# Patient Record
Sex: MALE | Race: WHITE | Employment: OTHER | URBAN - METROPOLITAN AREA
[De-identification: names, ages, dates, MRNs, and addresses within clinical notes are randomized per-mention and may not be internally consistent; named-entity substitution may affect disease eponyms.]

---

## 2017-03-14 ENCOUNTER — HOSPITAL ENCOUNTER (OUTPATIENT)
Dept: SURGERY | Age: 77
Discharge: HOME | End: 2017-03-14
Payer: MEDICARE

## 2017-03-14 ENCOUNTER — HOSPITAL ENCOUNTER (OUTPATIENT)
Dept: PHYSICAL THERAPY | Age: 77
Discharge: HOME | End: 2017-03-14
Payer: MEDICARE

## 2017-03-14 VITALS
SYSTOLIC BLOOD PRESSURE: 153 MMHG | OXYGEN SATURATION: 95 % | DIASTOLIC BLOOD PRESSURE: 83 MMHG | HEART RATE: 52 BPM | BODY MASS INDEX: 30.88 KG/M2 | WEIGHT: 228 LBS | HEIGHT: 72 IN | TEMPERATURE: 96.8 F

## 2017-03-14 LAB
ANION GAP BLD CALC-SCNC: 7 MMOL/L (ref 7–16)
APPEARANCE UR: CLEAR
APTT PPP: 27.5 SEC (ref 25.3–32.9)
ATRIAL RATE: 50 BPM
BACTERIA SPEC CULT: ABNORMAL
BASOPHILS # BLD AUTO: 0.1 K/UL (ref 0–0.2)
BASOPHILS # BLD: 1 % (ref 0–2)
BILIRUB UR QL: NEGATIVE
BUN SERPL-MCNC: 27 MG/DL (ref 8–23)
CALCIUM SERPL-MCNC: 9.2 MG/DL (ref 8.3–10.4)
CALCULATED P AXIS, ECG09: 8 DEGREES
CALCULATED R AXIS, ECG10: 61 DEGREES
CALCULATED T AXIS, ECG11: 7 DEGREES
CHLORIDE SERPL-SCNC: 106 MMOL/L (ref 98–107)
CO2 SERPL-SCNC: 30 MMOL/L (ref 21–32)
COLOR UR: YELLOW
CREAT SERPL-MCNC: 1.24 MG/DL (ref 0.8–1.5)
DIAGNOSIS, 93000: NORMAL
DIASTOLIC BP, ECG02: NORMAL MMHG
DIFFERENTIAL METHOD BLD: ABNORMAL
EOSINOPHIL # BLD: 0.4 K/UL (ref 0–0.8)
EOSINOPHIL NFR BLD: 5 % (ref 0.5–7.8)
ERYTHROCYTE [DISTWIDTH] IN BLOOD BY AUTOMATED COUNT: 15.3 % (ref 11.9–14.6)
GLUCOSE SERPL-MCNC: 93 MG/DL (ref 65–100)
GLUCOSE UR STRIP.AUTO-MCNC: NEGATIVE MG/DL
HCT VFR BLD AUTO: 42.6 % (ref 41.1–50.3)
HGB BLD-MCNC: 14.3 G/DL (ref 13.6–17.2)
HGB UR QL STRIP: NEGATIVE
IMM GRANULOCYTES # BLD: 0 K/UL (ref 0–0.5)
IMM GRANULOCYTES NFR BLD AUTO: 0.2 % (ref 0–5)
INR PPP: 1 (ref 0.9–1.2)
KETONES UR QL STRIP.AUTO: NEGATIVE MG/DL
LEUKOCYTE ESTERASE UR QL STRIP.AUTO: NEGATIVE
LYMPHOCYTES # BLD AUTO: 41 % (ref 13–44)
LYMPHOCYTES # BLD: 3.3 K/UL (ref 0.5–4.6)
MCH RBC QN AUTO: 30.4 PG (ref 26.1–32.9)
MCHC RBC AUTO-ENTMCNC: 33.6 G/DL (ref 31.4–35)
MCV RBC AUTO: 90.4 FL (ref 79.6–97.8)
MONOCYTES # BLD: 0.6 K/UL (ref 0.1–1.3)
MONOCYTES NFR BLD AUTO: 8 % (ref 4–12)
NEUTS SEG # BLD: 3.7 K/UL (ref 1.7–8.2)
NEUTS SEG NFR BLD AUTO: 45 % (ref 43–78)
NITRITE UR QL STRIP.AUTO: NEGATIVE
P-R INTERVAL, ECG05: 156 MS
PH UR STRIP: 5.5 [PH] (ref 5–9)
PLATELET # BLD AUTO: 202 K/UL (ref 150–450)
PMV BLD AUTO: 12.2 FL (ref 10.8–14.1)
POTASSIUM SERPL-SCNC: 4.8 MMOL/L (ref 3.5–5.1)
PROT UR STRIP-MCNC: NEGATIVE MG/DL
PROTHROMBIN TIME: 10.6 SEC (ref 9.6–12)
Q-T INTERVAL, ECG07: 460 MS
QRS DURATION, ECG06: 98 MS
QTC CALCULATION (BEZET), ECG08: 419 MS
RBC # BLD AUTO: 4.71 M/UL (ref 4.23–5.67)
SERVICE CMNT-IMP: ABNORMAL
SODIUM SERPL-SCNC: 143 MMOL/L (ref 136–145)
SP GR UR REFRACTOMETRY: 1.02 (ref 1–1.02)
SYSTOLIC BP, ECG01: NORMAL MMHG
UROBILINOGEN UR QL STRIP.AUTO: 0.2 EU/DL (ref 0.2–1)
VENTRICULAR RATE, ECG03: 50 BPM
WBC # BLD AUTO: 8.1 K/UL (ref 4.3–11.1)

## 2017-03-14 PROCEDURE — G8980 MOBILITY D/C STATUS: HCPCS

## 2017-03-14 PROCEDURE — 85730 THROMBOPLASTIN TIME PARTIAL: CPT | Performed by: ORTHOPAEDIC SURGERY

## 2017-03-14 PROCEDURE — 85610 PROTHROMBIN TIME: CPT | Performed by: ORTHOPAEDIC SURGERY

## 2017-03-14 PROCEDURE — G8978 MOBILITY CURRENT STATUS: HCPCS

## 2017-03-14 PROCEDURE — 80048 BASIC METABOLIC PNL TOTAL CA: CPT | Performed by: ORTHOPAEDIC SURGERY

## 2017-03-14 PROCEDURE — 97161 PT EVAL LOW COMPLEX 20 MIN: CPT

## 2017-03-14 PROCEDURE — 85025 COMPLETE CBC W/AUTO DIFF WBC: CPT | Performed by: ORTHOPAEDIC SURGERY

## 2017-03-14 PROCEDURE — 81003 URINALYSIS AUTO W/O SCOPE: CPT | Performed by: ORTHOPAEDIC SURGERY

## 2017-03-14 PROCEDURE — 93005 ELECTROCARDIOGRAM TRACING: CPT | Performed by: ANESTHESIOLOGY

## 2017-03-14 PROCEDURE — G8979 MOBILITY GOAL STATUS: HCPCS

## 2017-03-14 PROCEDURE — 36415 COLL VENOUS BLD VENIPUNCTURE: CPT | Performed by: ORTHOPAEDIC SURGERY

## 2017-03-14 PROCEDURE — 87641 MR-STAPH DNA AMP PROBE: CPT | Performed by: ORTHOPAEDIC SURGERY

## 2017-03-14 RX ORDER — BROMPHENIRAMINE MALEATE, DEXTROMETHORPHAN HBR, PHENYLEPHRINE HCL, DIPHENHYDRAMINE HCL, PHENYLEPHRINE HCL 0.52G
1 KIT ORAL DAILY
COMMUNITY

## 2017-03-14 RX ORDER — ASPIRIN 81 MG/1
81 TABLET ORAL DAILY
COMMUNITY
End: 2017-04-06

## 2017-03-14 RX ORDER — SIMVASTATIN 20 MG/1
20 TABLET, FILM COATED ORAL DAILY
COMMUNITY

## 2017-03-14 RX ORDER — METOPROLOL TARTRATE 50 MG/1
50 TABLET ORAL 2 TIMES DAILY
COMMUNITY

## 2017-03-14 RX ORDER — AMLODIPINE BESYLATE 5 MG/1
5 TABLET ORAL DAILY
COMMUNITY

## 2017-03-14 RX ORDER — LISINOPRIL 40 MG/1
40 TABLET ORAL DAILY
COMMUNITY

## 2017-03-14 NOTE — PROGRESS NOTES
Nelda Diamond  : (42 y.o.) [facility] [place] [address], [address] [facility]  [phone]       Physical Therapy Prehab Plan of Treatment and Evaluation Summary:3/14/2017    ICD-10: Treatment Diagnosis:   · Pain in Right Knee (M25.561)  · Stiffness of Right Knee, Not elsewhere classified (M25.661)  · Difficulty in walking, Not elsewhere classified (R26.2)  · Other abnormalities of gait and mobility (R26.89)  Precautions/Allergies:   Pcn [penicillins]  MEDICAL/REFERRING DIAGNOSIS:  Unilateral primary osteoarthritis, right knee [M17.11]  REFERRING PHYSICIAN: Mary Alice Thompson MD  DATE OF SURGERY: 17   Assessment:   Comments:  Pain in right knee joint with decreased independence with functional mobility. Pt plans to return home following hospital stay. Pt's wife present and supportive. PROBLEM LIST (Impacting functional limitations):  Mr. Abram Meehan presents with the following right lower extremity(s) problems:  1. Transfers  2. Gait  3. Strength  4. Range of Motion   INTERVENTIONS PLANNED:  1. Home Exercise Program  2. Educational Discussion     TREATMENT PLAN: Effective Dates: 3/14/2017 TO 3/14/2017. Frequency/Duration: Patient to continue to perform home exercise program at least twice per day up until his surgery. GOALS: (Goals have been discussed and agreed upon with patient.)  Discharge Goals: Time Frame: 1 Day  1. Patient will demonstrate independence with a home exercise program designed to increase strength, range of motion and pain control to minimize functional deficits and optimize patient for total joint replacement. Rehabilitation Potential For Stated Goals: Good  Regarding Coni Livingston therapy, I certify that the treatment plan above will be carried out by a therapist or under their direction.   Thank you for this referral,  Trae Garber, PT               HISTORY:   Present Symptoms:  Pain Intensity 1: 0   History of Present Injury/Illness (Reason for Referral):  Medical/Referring Diagnosis: Unilateral primary osteoarthritis, right knee [M17.11]   Past Medical History/Comorbidities:   Mr. Nicole Fothergill  has a past medical history of Arthritis; CAD (coronary artery disease); Cancer ([facility] [address] High cholesterol; Hypertension; and Morbid obesity ([facility] [address] He also has no past medical history of Adverse effect of anesthesia; Aneurysm ([facility] [address] Arrhythmia; Asthma; Autoimmune disease ([facility] [address] Chronic kidney disease; Chronic obstructive pulmonary disease ([address] Chronic pain; Coagulation disorder ([address] Diabetes ([facility] [address] Difficult intubation; Endocarditis; GERD (gastroesophageal reflux disease); Heart failure ([facility] [address] Ill-defined condition; Liver disease; Malignant hyperthermia due to anesthesia; Nausea & vomiting; Pseudocholinesterase deficiency; Psychiatric disorder; PUD (peptic ulcer disease); Rheumatic fever; Seizures ([facility] [address] Sleep apnea; Stroke [facility]); Thromboembolus ([facility] [address] or Thyroid disease. Mr. Nicole Fothergill  has a past surgical history that includes coronary artery bypass graft (2005) and heart catheterization (2005).   Social History/Living Environment:   Home Environment: Private residence  # Steps to Enter: 4  One/Two Story Residence: One story  Living Alone: No  Support Systems: Spouse/Significant Other/Partner  Patient Expects to be Discharged to[de-identified] Private residence  Current DME Used/Available at Home: Shower chair, Commode, bedside  Tub or Shower Type: Tub/Shower combination  Work/Activity:  retired  Dominant Side:  RIGHT  Current Medications:  See Pre-assessment nursing note   Number of Personal Factors/Comorbidities that affect the Plan of Care: 0: LOW COMPLEXITY   EXAMINATION:   ADLs (Current Functional Status):   Ambulation:  [x] Independent  [] Walk Indoors Only  [] Walk Outdoors  [] Use Assistive Device  [] Use Wheelchair Only Dressing:  [x] Independent  Requires Assistance from Someone for:  [] Sock/Shoes  [] Pants  [] Everything   Bathing/Showering:   [x] Independent  [] Requires Assistance from Someone  [] Sponge Bath Only Household Activities:  [x] Routine house and yard work  [] Light Housework Only  [] None   Observation/Orthostatic Postural Assessment:   Within defined limits   ROM/Flexibility:   Gross Assessment: Yes  AROM: Generally decreased, functional                LLE Assessment  LLE Assessment (WDL): Within defined limits      RLE AROM  R Knee Flexion: 100  R Knee Extension: 20   Strength:   Gross Assessment: Yes  Strength: Generally decreased, functional                  Functional Mobility:    Gross Assessment: Yes  Coordination: Generally decreased, functional    Gait Description (WDL): Within defined limits  Stand to Sit: Independent  Sit to Stand: Independent  Distance (ft): 1000 Feet (ft)  Ambulation - Level of Assistance: Independent  Gait Abnormalities: Antalgic          Balance:    Sitting: Intact  Sitting - Static: Good (unsupported)  Sitting - Dynamic: Good (unsupported)  Standing: Intact   Body Structures Involved:  1. Bones  2. Joints  3. Muscles Body Functions Affected:  1. Neuromusculoskeletal Activities and Participation Affected:  1. Mobility   Number of elements that affect the Plan of Care: 4+: HIGH COMPLEXITY   CLINICAL PRESENTATION:   Presentation: Stable and uncomplicated: LOW COMPLEXITY   CLINICAL DECISION MAKING:   Outcome Measure: Tool Used: Lower Extremity Functional Scale (LEFS)  Score:  Initial: 41/80 Most Recent: X/80 (Date: -- )   Interpretation of Score: 20 questions each scored on a 5 point scale with 0 representing \"extreme difficulty or unable to perform\" and 4 representing \"no difficulty\". The lower the score, the greater the functional disability. 80/80 represents no disability. Minimal detectable change is 9 points. Score 80 79-65 64-49 48-33 32-17 16-1 0   Modifier CH CI CJ CK CL CM CN     ?  Mobility - Walking and Moving Around:     - CURRENT STATUS: CK - 40%-59% impaired, limited or restricted    - GOAL STATUS: CK - 40%-59% impaired, limited or restricted    - D/C STATUS:  CK - 40%-59% impaired, limited or restricted  Medical Necessity:   · Mr. Walter Carlos is expected to optimize his lower extremity strength and ROM in preparation for joint replacement surgery. Reason for Services/Other Comments:  · Achieve baseline assesment of musculoskeletal system, functional mobility and home environment. , educate in PT HEP in preparation for surgery, educate in hospital plan of care. Use of outcome tool(s) and clinical judgement create a POC that gives a: Clear prediction of patient's progress: LOW COMPLEXITY   TREATMENT:   Treatment/Session Assessment:  Patient was instructed in PT- HEP to increase strength and ROM in LEs. Answered all questions. · Post session pain:  7  · Compliance with Program/Exercises: compliant most of the time.   Total Treatment Duration:  PT Patient Time In/Time Out  Time In: 1215  Time Out: [address], PT

## 2017-03-14 NOTE — PERIOP NOTES
Labs dated 3/14/17 routed via [facility] to patients PCP, Jessica Nava MD, via fax at [phone] per Dr. Jesús Bashir request. Please contact surgeon, Dr. Master Agrawal, with any questions or concerns ([phone]). Recent Results (from the past 12 hour(s))   URINALYSIS W/ RFLX MICROSCOPIC    Collection Time: 03/14/17 12:00 PM   Result Value Ref Range    Color YELLOW      Appearance CLEAR      Specific gravity 1.023 1.001 - 1.023      pH (UA) 5.5 5.0 - 9.0      Protein NEGATIVE  NEG mg/dL    Glucose NEGATIVE  mg/dL    Ketone NEGATIVE  NEG mg/dL    Bilirubin NEGATIVE  NEG      Blood NEGATIVE  NEG      Urobilinogen 0.2 0.2 - 1.0 EU/dL    Nitrites NEGATIVE  NEG      Leukocyte Esterase NEGATIVE  NEG     CBC WITH AUTOMATED DIFF    Collection Time: 03/14/17 12:40 PM   Result Value Ref Range    WBC 8.1 4.3 - 11.1 K/uL    RBC 4.71 4.23 - 5.67 M/uL    HGB 14.3 13.6 - 17.2 g/dL    HCT 42.6 41.1 - 50.3 %    MCV 90.4 79.6 - 97.8 FL    MCH 30.4 26.1 - 32.9 PG    MCHC 33.6 31.4 - 35.0 g/dL    RDW 15.3 (H) 11.9 - 14.6 %    PLATELET 344 281 - 217 K/uL    MPV 12.2 10.8 - 14.1 FL    DF AUTOMATED      NEUTROPHILS 45 43 - 78 %    LYMPHOCYTES 41 13 - 44 %    MONOCYTES 8 4.0 - 12.0 %    EOSINOPHILS 5 0.5 - 7.8 %    BASOPHILS 1 0.0 - 2.0 %    IMMATURE GRANULOCYTES 0.2 0.0 - 5.0 %    ABS. NEUTROPHILS 3.7 1.7 - 8.2 K/UL    ABS. LYMPHOCYTES 3.3 0.5 - 4.6 K/UL    ABS. MONOCYTES 0.6 0.1 - 1.3 K/UL    ABS. EOSINOPHILS 0.4 0.0 - 0.8 K/UL    ABS. BASOPHILS 0.1 0.0 - 0.2 K/UL    ABS. IMM.  GRANS. 0.0 0.0 - 0.5 K/UL   METABOLIC PANEL, BASIC    Collection Time: 03/14/17 12:40 PM   Result Value Ref Range    Sodium 143 136 - 145 mmol/L    Potassium 4.8 3.5 - 5.1 mmol/L    Chloride 106 98 - 107 mmol/L    CO2 30 21 - 32 mmol/L    Anion gap 7 7 - 16 mmol/L    Glucose 93 65 - 100 mg/dL    BUN 27 (H) 8 - 23 MG/DL    Creatinine 1.24 0.8 - 1.5 MG/DL    GFR est AA >60 >60 ml/min/1.73m2    GFR est non-AA >60 >60 ml/min/1.73m2    Calcium 9.2 8.3 - 10.4 MG/DL PROTHROMBIN TIME + INR    Collection Time: 03/14/17 12:40 PM   Result Value Ref Range    Prothrombin time 10.6 9.6 - 12.0 sec    INR 1.0 0.9 - 1.2     PTT    Collection Time: 03/14/17 12:40 PM   Result Value Ref Range    aPTT 27.5 25.3 - 32.9 SEC

## 2017-03-14 NOTE — PROGRESS NOTES
17 1200   Oxygen Therapy   O2 Sat (%) 95 %   Pulse via Oximetry 55 beats per minute   O2 Device Room air   Pre-Treatment   Breath Sounds Bilateral Clear   Pre FEV1 (liters) 2.7 liters   % Predicted 81   Incentive Spirometry Treatment   Actual Volume (ml) 2000 ml     Initial respiratory Assessment completed with pt. Pt was interviewed and evaluated in [facility] prior to surgery. Patient ID:  Salomon Hunter  [id number]  84 y.o.  1940  Surgeon: Dr. Nicole Wright  Date of Surgery: 2017  Procedure: Total KNEE Arthroplasty  Primary Care Physician: Omaira Fitzgerald -[phone]  Specialists:                                  Pt instructed in the use of Incentive Spirometry. Pt instructed to bring Incentive Spirometer back on date of surgery & to start using Is upon return to pt room. Pt taught proper cough technique      History of smokin PPD FOR 20 YEARS     Quit date:40 YEARS  AGO    Secondhand smoke:      Past procedures with Oxygen desaturation:NONE    Past Medical History:   Diagnosis Date    Arthritis     CAD (coronary artery disease)     CABG-    Cancer ([facility] [address]     skin cancer    High cholesterol     Hypertension     on med for control     Morbid obesity ([address]     BMI-30.9                                                                                                                                                       Respiratory history:                                DENIES SOB                                 HX OF KIMI? NO        Respiratory meds:                                                     PAST SLEEP STUDY:            NO                                        FAMILY PRESENT:   WIFE                                    KIMI assessment:                                               SLEEPS ON   BACK  & STOMACH                                                 PHYSICAL EXAM   Body mass index is 30.92 kg/(m^2).    Visit Vitals    /83 (BP 1 Location: Right arm, BP Patient Position: At rest;Sitting)    Pulse (!) 52    Temp 96.8 °F (36 °C)    Ht 6' (1.829 m)    Wt 103.4 kg (228 lb)    SpO2 95%    BMI 30.92 kg/m2     Neck circumference:  41.5    cm    Loud snoring:YES      Witnessed apnea or wakening gasping or choking:,DENIES,    Awakens with headaches:DENIES    Morning or daytime tiredness/ sleepiness: DENIES  - NAPS EVERY DAY 30-40 MIN. Dry mouth or sore throat in morning:DENIES    Freidman stage:4    SACS score:18    STOP/BAN        Sleepiness Scale:     Sitting and reading 3    Watching TV 2    Sitting inactive in a public place 0    As a passenger in a car for an hour without a break 0    Lying down to rest in the afternoon when circumstances Permits 3    Sitting and talking to someone 0    Sitting quietly after lunch without alcohol 3    In a car, while stopped for a few minutes 0    Total :  11                          CPAP:NONE               CONT SAT HS          Referrals:DECLINED    Pt.  Phone Number:

## 2017-03-14 NOTE — PERIOP NOTES
Patient verified name, , and surgery as listed in [facility]. Type 3 surgery, walk in assessment complete. Labs per surgeon: CBC, BMP, U/A, PT/PTT; results within [facility] protocols. MRSA nasal swab pending. Labs per anesthesia protocol: included in surgeons orders. EKG: done today; within [facility] protocols. Requesting most recent office note, ekg, stress test, and ehco from [facility]. Hibiclens and instructions given per hospital policy. Nasal Swab collected per MD order and instructions for Mupirocin nasal ointment if required. Patient provided with handouts including Guide to Surgery, Pain Management, Hand Hygiene, Blood Transfusion Education, and [facility] Brochure. Patient answered medical/surgical history questions at their best of ability. All prior to admission medications documented in [facility]. Original medication prescription bottles not visualized during patient appointment. Patient instructed to hold all vitamins 7 days prior to surgery and NSAIDS 5 days prior to surgery. Medications to be held prior to surgery: fish oil. Patient instructed to continue previous medications as prescribed prior to surgery and to take the following medications the day of surgery according to anesthesia guidelines with a small sip of water: amlodipine, 81 mg aspirin, metoprolol, simvastatin. Patient taught back and verbalized understanding.

## 2017-03-15 PROBLEM — R06.83 SNORING: Status: ACTIVE | Noted: 2017-03-15

## 2017-03-15 PROBLEM — R53.83 FATIGUE: Status: ACTIVE | Noted: 2017-03-15

## 2017-03-15 RX ORDER — MUPIROCIN 20 MG/G
OINTMENT TOPICAL 2 TIMES DAILY
COMMUNITY
Start: 2017-03-30 | End: 2017-04-06

## 2017-03-15 NOTE — ADVANCED PRACTICE NURSE
Total Joint Surgery Preoperative Chart Review      Patient ID:  Stefania Shields  [id number]  17 y.o.  1940  Surgeon: Dr. Ynes Gillespie  Date of Surgery: 4/4/2017  Procedure: Total Right Knee Arthroplasty  Primary Care Physician: Fariha Arana -[phone]      Subjective:   Stefania Shields is a 68 y.o. WHITE OR  male who presents for preoperative evaluation for Total Right Knee arthroplasty. This is a preoperative chart review note based on data collected by the nurse at the surgical Pre-Assessment visit. Past Medical History:   Diagnosis Date    Arthritis     CAD (coronary artery disease)     CABG-2005    Cancer ([facility] [address]     skin cancer    High cholesterol     Hypertension     on med for control     Morbid obesity ([facility] [address]     BMI-30.9      Past Surgical History:   Procedure Laterality Date    HX CORONARY ARTERY BYPASS GRAFT  2005    5 bypass    HX HEART CATHETERIZATION  2005    no stents     Family History   Problem Relation Age of Onset    Heart Disease Mother     Heart Disease Father     Heart Attack Father       Social History   Substance Use Topics    Smoking status: Former Smoker     Packs/day: 1.00     Years: 15.00     Quit date: 3/14/1974    Smokeless tobacco: Never Used    Alcohol use No      Comment: beer occasionally       Prior to Admission medications    Medication Sig Start Date End Date Taking? Authorizing Provider   aspirin delayed-release 81 mg tablet Take 81 mg by mouth daily. Take / use AM day of surgery  per anesthesia protocols. Yes Historical Provider   metoprolol tartrate (LOPRESSOR) 50 mg tablet Take 50 mg by mouth two (2) times a day. Take / use AM day of surgery  per anesthesia protocols. Yes Historical Provider   amLODIPine (NORVASC) 5 mg tablet Take 5 mg by mouth daily. Take / use AM day of surgery  per anesthesia protocols. Indications: hypertension   Yes Historical Provider   lisinopril (PRINIVIL, ZESTRIL) 40 mg tablet Take 40 mg by mouth daily.  Indications: hypertension   Yes Historical Provider   simvastatin (ZOCOR) 20 mg tablet Take 20 mg by mouth daily. Take / use AM day of surgery  per anesthesia protocols. Yes Historical Provider   fish,bora,flax oils-om3,6,9 #1 (OMEGA 3-6-9) 1,200 mg cap Take 2 Caps by mouth two (2) times a day. Yes Historical Provider   psyllium (METAMUCIL) 0.52 gram capsule Take 1 Cap by mouth daily.    Yes Historical Provider     Allergies   Allergen Reactions    Pcn [Penicillins] Swelling     Hand swelling          Objective:     Physical Exam:   Patient Vitals for the past 24 hrs:   Temp Pulse BP SpO2   03/14/17 1339 96.8 °F (36 °C) (!) 52 153/83 95 %   03/14/17 1200 - - - 95 %       ECG:    EKG Results     Procedure 720 Value Units Date/Time    EKG, 12 LEAD, INITIAL [[id number]] Collected:  03/14/17 1412    Order Status:  Completed Updated:  25/51/42 1362     Systolic BP -- mmHg      Diastolic BP -- mmHg      Ventricular Rate 50 BPM      Atrial Rate 50 BPM      P-R Interval 156 ms      QRS Duration 98 ms      Q-T Interval 460 ms      QTC Calculation (Bezet) 419 ms      Calculated P Axis 8 degrees      Calculated R Axis 61 degrees      Calculated T Axis 7 degrees      Diagnosis --     Sinus bradycardia  Otherwise normal ECG  No previous ECGs available  Confirmed by KALE WALLS (), LESLIE PAREDES ([id number]) on 3/14/2017 4:18:44 PM            Data Review:   Labs:   Recent Results (from the past 24 hour(s))   URINALYSIS W/ RFLX MICROSCOPIC    Collection Time: 03/14/17 12:00 PM   Result Value Ref Range    Color YELLOW      Appearance CLEAR      Specific gravity 1.023 1.001 - 1.023      pH (UA) 5.5 5.0 - 9.0      Protein NEGATIVE  NEG mg/dL    Glucose NEGATIVE  mg/dL    Ketone NEGATIVE  NEG mg/dL    Bilirubin NEGATIVE  NEG      Blood NEGATIVE  NEG      Urobilinogen 0.2 0.2 - 1.0 EU/dL    Nitrites NEGATIVE  NEG      Leukocyte Esterase NEGATIVE  NEG     CBC WITH AUTOMATED DIFF    Collection Time: 03/14/17 12:40 PM   Result Value Ref Range    WBC 8.1 4.3 - 11.1 K/uL    RBC 4.71 4.23 - 5.67 M/uL    HGB 14.3 13.6 - 17.2 g/dL    HCT 42.6 41.1 - 50.3 %    MCV 90.4 79.6 - 97.8 FL    MCH 30.4 26.1 - 32.9 PG    MCHC 33.6 31.4 - 35.0 g/dL    RDW 15.3 (H) 11.9 - 14.6 %    PLATELET 014 376 - 874 K/uL    MPV 12.2 10.8 - 14.1 FL    DF AUTOMATED      NEUTROPHILS 45 43 - 78 %    LYMPHOCYTES 41 13 - 44 %    MONOCYTES 8 4.0 - 12.0 %    EOSINOPHILS 5 0.5 - 7.8 %    BASOPHILS 1 0.0 - 2.0 %    IMMATURE GRANULOCYTES 0.2 0.0 - 5.0 %    ABS. NEUTROPHILS 3.7 1.7 - 8.2 K/UL    ABS. LYMPHOCYTES 3.3 0.5 - 4.6 K/UL    ABS. MONOCYTES 0.6 0.1 - 1.3 K/UL    ABS. EOSINOPHILS 0.4 0.0 - 0.8 K/UL    ABS. BASOPHILS 0.1 0.0 - 0.2 K/UL    ABS. IMM. GRANS. 0.0 0.0 - 0.5 K/UL   METABOLIC PANEL, BASIC    Collection Time: 03/14/17 12:40 PM   Result Value Ref Range    Sodium 143 136 - 145 mmol/L    Potassium 4.8 3.5 - 5.1 mmol/L    Chloride 106 98 - 107 mmol/L    CO2 30 21 - 32 mmol/L    Anion gap 7 7 - 16 mmol/L    Glucose 93 65 - 100 mg/dL    BUN 27 (H) 8 - 23 MG/DL    Creatinine 1.24 0.8 - 1.5 MG/DL    GFR est AA >60 >60 ml/min/1.73m2    GFR est non-AA >60 >60 ml/min/1.73m2    Calcium 9.2 8.3 - 10.4 MG/DL   PROTHROMBIN TIME + INR    Collection Time: 03/14/17 12:40 PM   Result Value Ref Range    Prothrombin time 10.6 9.6 - 12.0 sec    INR 1.0 0.9 - 1.2     PTT    Collection Time: 03/14/17 12:40 PM   Result Value Ref Range    aPTT 27.5 25.3 - 32.9 SEC   MSSA/MRSA SC BY PCR, NASAL SWAB    Collection Time: 03/14/17  2:10 PM   Result Value Ref Range    Special Requests: NO SPECIAL REQUESTS      Culture result: (A)       MRSA target DNA not detected, SA target DNA detected. A MRSA negative, SA positive test result does not preclude MRSA nasal colonization.    EKG, 12 LEAD, INITIAL    Collection Time: 03/14/17  2:12 PM   Result Value Ref Range    Systolic BP  mmHg    Diastolic BP  mmHg    Ventricular Rate 50 BPM    Atrial Rate 50 BPM    P-R Interval 156 ms    QRS Duration 98 ms    Q-T Interval 460 ms    QTC Calculation (Bezet) 419 ms    Calculated P Axis 8 degrees    Calculated R Axis 61 degrees    Calculated T Axis 7 degrees    Diagnosis       Sinus bradycardia  Otherwise normal ECG  No previous ECGs available  Confirmed by KALE WALLS (), LESLIE PAREDES ([id number]) on 3/14/2017 4:18:44 PM           Problem List:  )  Patient Active Problem List   Diagnosis Code    Snoring R06.83    Fatigue R53.83       Total Joint Surgery Pre-Assessment Recommendations:          Patient at risk for KIMI not willing to have additional evaluation. Recommend continuous saturation monitoring hours of sleep, during hospitalization.          Signed By: Musa Moreno NP-C    March 15, 2017

## 2017-03-15 NOTE — PERIOP NOTES
Nasal swab results reviewed:   Culture result:  (A)    Final   MRSA target DNA not detected, SA target DNA detected.   A MRSA negative, SA positive test result does not preclude MRSA nasal colonization         Called pt and informed of results    Instructed:Called Mupirocin Rx to [facility] pharmacy .  Pt to apply to ea nostril intranasally twice a day for 5 days beginning :3/30/17

## 2017-03-16 NOTE — PERIOP NOTES
Received from [facility] stress test (5/15/15), ekg (6/25/15), office note (7/6/16) - all within anesthesia protocols. Placed on chart for reference DOS and chart filed.

## 2017-04-03 ENCOUNTER — ANESTHESIA EVENT (OUTPATIENT)
Dept: SURGERY | Age: 77
DRG: 470 | End: 2017-04-03
Payer: MEDICARE

## 2017-04-03 RX ORDER — SODIUM CHLORIDE, SODIUM LACTATE, POTASSIUM CHLORIDE, CALCIUM CHLORIDE 600; 310; 30; 20 MG/100ML; MG/100ML; MG/100ML; MG/100ML
100 INJECTION, SOLUTION INTRAVENOUS CONTINUOUS
Status: CANCELLED | OUTPATIENT
Start: 2017-04-03 | End: 2017-04-03

## 2017-04-03 RX ORDER — SODIUM CHLORIDE 0.9 % (FLUSH) 0.9 %
5-10 SYRINGE (ML) INJECTION AS NEEDED
Status: CANCELLED | OUTPATIENT
Start: 2017-04-03

## 2017-04-03 RX ORDER — HYDROMORPHONE HYDROCHLORIDE 2 MG/ML
0.5 INJECTION, SOLUTION INTRAMUSCULAR; INTRAVENOUS; SUBCUTANEOUS
Status: CANCELLED | OUTPATIENT
Start: 2017-04-03

## 2017-04-03 RX ORDER — OXYCODONE HYDROCHLORIDE 5 MG/1
10 TABLET ORAL
Status: CANCELLED | OUTPATIENT
Start: 2017-04-03

## 2017-04-03 RX ORDER — ACETAMINOPHEN 500 MG
1000 TABLET ORAL ONCE
Status: CANCELLED | OUTPATIENT
Start: 2017-04-03 | End: 2017-04-03

## 2017-04-03 NOTE — H&P
H&P    Patient ID:  Donta Hendrix  [id number]  21 y.o.  1940  Surgeon:  Paige De Leon MD  Date of Surgery: * No surgery date entered *  Procedure: Right Knee Total Arthroplasty  Primary Care Physician: Kaushik Ramos MD        Subjective:  Donta Hendrix is a 68 y.o. WHITE [place]  male who presents with Right Knee pain. He has history of Right Knee pain for several months ago. Symptoms worse with walking and relieved with rest. Conservative treatment consisting of  activity modification has not helped. The patient  lives with their spouse. The patients goal after surgery is improved pain and function. Past Medical History:   Diagnosis Date    Arthritis     CAD (coronary artery disease)     CABG-2005    Cancer ([facility]     skin cancer    High cholesterol     Hypertension     on med for control     Morbid obesity ([facility] [address]     BMI-30.9      Past Surgical History:   Procedure Laterality Date    HX CORONARY ARTERY BYPASS GRAFT  2005    5 bypass    HX HEART CATHETERIZATION  2005    no stents     Family History   Problem Relation Age of Onset    Heart Disease Mother     Heart Disease Father     Heart Attack Father       Social History   Substance Use Topics    Smoking status: Former Smoker     Packs/day: 1.00     Years: 15.00     Quit date: 3/14/1974    Smokeless tobacco: Never Used    Alcohol use No      Comment: beer occasionally       Prior to Admission medications    Medication Sig Start Date End Date Taking? Authorizing Provider   mupirocin (BACTROBAN) 2 % ointment by Both Nostrils route two (2) times a day. 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 Take / use AM day of surgery  per anesthesia protocols. Historical Provider   metoprolol tartrate (LOPRESSOR) 50 mg tablet Take 50 mg by mouth two (2) times a day. Take / use AM day of surgery  per anesthesia protocols. Historical Provider   amLODIPine (NORVASC) 5 mg tablet Take 5 mg by mouth daily. Take / use AM day of surgery  per anesthesia protocols. Indications: hypertension    Historical Provider   lisinopril (PRINIVIL, ZESTRIL) 40 mg tablet Take 40 mg by mouth daily. Indications: hypertension    Historical Provider   simvastatin (ZOCOR) 20 mg tablet Take 20 mg by mouth daily. Take / use AM day of surgery  per anesthesia protocols. Historical Provider   fish,bora,flax oils-om3,6,9 #1 (OMEGA 3-6-9) 1,200 mg cap Take 2 Caps by mouth two (2) times a day. Historical Provider   psyllium (METAMUCIL) 0.52 gram capsule Take 1 Cap by mouth daily. Historical Provider     Allergies   Allergen Reactions    Pcn [Penicillins] Swelling     Hand swelling        REVIEW OF SYSTEMS:  CONSTITUTIONAL: Denies fever, decreased appetite, weight loss/gain, night sweats or fatigue. HEENT: Denies vision or hearing changes. denies glasses. denies hearing aids. CARDIAC: Denies CP, palpitations, rheumatic fever, murmur, peripheral edema, carotid artery disease or syncopal episodes. RESPIRATORY: Denies dyspnea on exertion, asthma, COPD or orthopnea. GI: Denies GERD, history of GI bleed or melena, PUD, hepatitis or cirrhosis. : Denies dysuria, hematuria. denies BPH symptoms. HEMATOLOGIC: Denies anemia or blood disorders. ENDOCRINE: Denies thyroid disease. MUSCULOSKELETAL: See HPI. NEUROLOGIC: Denies seizure, peripheral neuropathy or memory loss. PSYCH: Denies depression, anxiety or insomnia. SKIN: Denies rash or open sores. Objective:    PHYSICAL EXAM  GENERAL: Patient Vitals for the past 8 hrs:   Height Weight   04/03/17 0606 6' (1.829 m) 103 kg (227 lb 1.2 oz)    EYES: PERRL. EOM intact. MOUTH:Teeth and Gums normal. NECK: Full ROM. Trachea midline. No thyromegaly or JVD. CARDIOVASCULAR: Regular rate and rhythm. No murmur or gallops. No carotid bruits. Peripheral pulses: radial 2 +, PT 2+, DP 2+ bilaterally. LUNGS: CTA bilaterally.  No wheezes, rhonchi or rales. GI: positive BS. Abdomen nontender. NEUROLOGIC: Alert and oriented x 3. Bilateral equal strong had grasp and bilateral equal strong plantar flexion and dorsiflexion. GAIT: abnormal  MUSCULOSKELETAL: ROM: full range with pain. Tenderness: Lateral joint line. Crepitus: present. SKIN: No rash, bruising, swelling, redness or warmth. Labs:  No results found for this or any previous visit (from the past 24 hour(s)). Xray Right Knee: Joint space narrowing    Assessment:  Advanced Right Knee Osteoarthritis. Total Right Knee Arthroplasty Indicated. Patient Active Problem List   Diagnosis Code    Snoring R06.83    Fatigue R53.83       Plan:  I have advised the patient of the risks and consequences, including possible complications of performing total joint replacement, as well as not doing this operation. The patient had the opportunity to ask questions and have them answered to their satisfaction.      Signed:  JESSICA Castrejon 4/3/2017

## 2017-04-04 ENCOUNTER — SURGERY (OUTPATIENT)
Age: 77
End: 2017-04-04

## 2017-04-04 ENCOUNTER — ANESTHESIA (OUTPATIENT)
Dept: SURGERY | Age: 77
DRG: 470 | End: 2017-04-04
Payer: MEDICARE

## 2017-04-04 ENCOUNTER — HOSPITAL ENCOUNTER (INPATIENT)
Age: 77
LOS: 2 days | Discharge: HOME HEALTH | DRG: 470 | End: 2017-04-06
Attending: ORTHOPAEDIC SURGERY | Admitting: ORTHOPAEDIC SURGERY
Payer: MEDICARE

## 2017-04-04 PROBLEM — M19.90 OSTEOARTHRITIS: Status: ACTIVE | Noted: 2017-04-04

## 2017-04-04 LAB
ABO + RH BLD: NORMAL
BLOOD GROUP ANTIBODIES SERPL: NORMAL
GLUCOSE BLD STRIP.AUTO-MCNC: 98 MG/DL (ref 65–100)
HGB BLD-MCNC: 12.5 G/DL (ref 13.6–17.2)
SPECIMEN EXP DATE BLD: NORMAL

## 2017-04-04 PROCEDURE — 65270000029 HC RM PRIVATE

## 2017-04-04 PROCEDURE — 74011000250 HC RX REV CODE- 250: Performed by: ORTHOPAEDIC SURGERY

## 2017-04-04 PROCEDURE — 77030006777 HC BLD SAW OSC CNMD -B: Performed by: ORTHOPAEDIC SURGERY

## 2017-04-04 PROCEDURE — 77030034849: Performed by: ORTHOPAEDIC SURGERY

## 2017-04-04 PROCEDURE — 77030003602 HC NDL NRV BLK BBMI -B: Performed by: ANESTHESIOLOGY

## 2017-04-04 PROCEDURE — 77030006720 HC BLD PAT RMR ZIMM -B: Performed by: ORTHOPAEDIC SURGERY

## 2017-04-04 PROCEDURE — 74011250637 HC RX REV CODE- 250/637: Performed by: ANESTHESIOLOGY

## 2017-04-04 PROCEDURE — 77010033678 HC OXYGEN DAILY

## 2017-04-04 PROCEDURE — 77030013819 HC MX SYS CEM ZIMM -B: Performed by: ORTHOPAEDIC SURGERY

## 2017-04-04 PROCEDURE — 74011250636 HC RX REV CODE- 250/636

## 2017-04-04 PROCEDURE — 77030013727 HC IRR FAN PULSVC ZIMM -B: Performed by: ORTHOPAEDIC SURGERY

## 2017-04-04 PROCEDURE — 76210000016 HC OR PH I REC 1 TO 1.5 HR: Performed by: ORTHOPAEDIC SURGERY

## 2017-04-04 PROCEDURE — 77030008467 HC STPLR SKN COVD -B: Performed by: ORTHOPAEDIC SURGERY

## 2017-04-04 PROCEDURE — 74011250636 HC RX REV CODE- 250/636: Performed by: ANESTHESIOLOGY

## 2017-04-04 PROCEDURE — 74011000258 HC RX REV CODE- 258: Performed by: ORTHOPAEDIC SURGERY

## 2017-04-04 PROCEDURE — 77030019557 HC ELECTRD VES SEAL MEDT -F: Performed by: ORTHOPAEDIC SURGERY

## 2017-04-04 PROCEDURE — 94760 N-INVAS EAR/PLS OXIMETRY 1: CPT

## 2017-04-04 PROCEDURE — 86580 TB INTRADERMAL TEST: CPT | Performed by: ORTHOPAEDIC SURGERY

## 2017-04-04 PROCEDURE — C1776 JOINT DEVICE (IMPLANTABLE): HCPCS | Performed by: ORTHOPAEDIC SURGERY

## 2017-04-04 PROCEDURE — 76942 ECHO GUIDE FOR BIOPSY: CPT | Performed by: ORTHOPAEDIC SURGERY

## 2017-04-04 PROCEDURE — 77030020782 HC GWN BAIR PAWS FLX 3M -B: Performed by: ANESTHESIOLOGY

## 2017-04-04 PROCEDURE — 77030012890

## 2017-04-04 PROCEDURE — 74011000250 HC RX REV CODE- 250: Performed by: ANESTHESIOLOGY

## 2017-04-04 PROCEDURE — 77030011208: Performed by: ORTHOPAEDIC SURGERY

## 2017-04-04 PROCEDURE — 77030012935 HC DRSG AQUACEL BMS -B: Performed by: ORTHOPAEDIC SURGERY

## 2017-04-04 PROCEDURE — 77030011640 HC PAD GRND REM COVD -A: Performed by: ORTHOPAEDIC SURGERY

## 2017-04-04 PROCEDURE — C1713 ANCHOR/SCREW BN/BN,TIS/BN: HCPCS | Performed by: ORTHOPAEDIC SURGERY

## 2017-04-04 PROCEDURE — 76060000034 HC ANESTHESIA 1.5 TO 2 HR: Performed by: ORTHOPAEDIC SURGERY

## 2017-04-04 PROCEDURE — 97161 PT EVAL LOW COMPLEX 20 MIN: CPT

## 2017-04-04 PROCEDURE — 86900 BLOOD TYPING SEROLOGIC ABO: CPT | Performed by: ORTHOPAEDIC SURGERY

## 2017-04-04 PROCEDURE — 85018 HEMOGLOBIN: CPT | Performed by: ORTHOPAEDIC SURGERY

## 2017-04-04 PROCEDURE — 77030031139 HC SUT VCRL2 J&J -A: Performed by: ORTHOPAEDIC SURGERY

## 2017-04-04 PROCEDURE — 77030007880 HC KT SPN EPDRL BBMI -B: Performed by: ANESTHESIOLOGY

## 2017-04-04 PROCEDURE — 97165 OT EVAL LOW COMPLEX 30 MIN: CPT

## 2017-04-04 PROCEDURE — 82962 GLUCOSE BLOOD TEST: CPT

## 2017-04-04 PROCEDURE — 77030036688 HC BLNKT CLD THER S2SG -B

## 2017-04-04 PROCEDURE — 74011000302 HC RX REV CODE- 302: Performed by: ORTHOPAEDIC SURGERY

## 2017-04-04 PROCEDURE — 77030018673: Performed by: ORTHOPAEDIC SURGERY

## 2017-04-04 PROCEDURE — 36415 COLL VENOUS BLD VENIPUNCTURE: CPT | Performed by: ORTHOPAEDIC SURGERY

## 2017-04-04 PROCEDURE — 76010000162 HC OR TIME 1.5 TO 2 HR INTENSV-TIER 1: Performed by: ORTHOPAEDIC SURGERY

## 2017-04-04 PROCEDURE — 74011000250 HC RX REV CODE- 250

## 2017-04-04 PROCEDURE — 74011250637 HC RX REV CODE- 250/637: Performed by: ORTHOPAEDIC SURGERY

## 2017-04-04 PROCEDURE — 77030003665 HC NDL SPN BBMI -A: Performed by: ANESTHESIOLOGY

## 2017-04-04 PROCEDURE — 77030019940 HC BLNKT HYPOTHRM STRY -B: Performed by: ANESTHESIOLOGY

## 2017-04-04 PROCEDURE — 94762 N-INVAS EAR/PLS OXIMTRY CONT: CPT

## 2017-04-04 PROCEDURE — 74011250636 HC RX REV CODE- 250/636: Performed by: ORTHOPAEDIC SURGERY

## 2017-04-04 PROCEDURE — 0SRC0J9 REPLACEMENT OF RIGHT KNEE JOINT WITH SYNTHETIC SUBSTITUTE, CEMENTED, OPEN APPROACH: ICD-10-PCS | Performed by: ORTHOPAEDIC SURGERY

## 2017-04-04 PROCEDURE — 76010010054 HC POST OP PAIN BLOCK: Performed by: ORTHOPAEDIC SURGERY

## 2017-04-04 PROCEDURE — 77030006804 HC BLD SAW RECIP CNMD -B: Performed by: ORTHOPAEDIC SURGERY

## 2017-04-04 PROCEDURE — 77030032490 HC SLV COMPR SCD KNE COVD -B

## 2017-04-04 PROCEDURE — 77030033067 HC SUT PDO STRATFX SPIR J&J -B: Performed by: ORTHOPAEDIC SURGERY

## 2017-04-04 PROCEDURE — 77030037623 HC FEM TRIAL PK ATTUNE INTTN J&J -D: Performed by: ORTHOPAEDIC SURGERY

## 2017-04-04 PROCEDURE — 77030018836 HC SOL IRR NACL ICUM -A: Performed by: ORTHOPAEDIC SURGERY

## 2017-04-04 DEVICE — BASEPLATE TIB SZ 7 KNEE CEM FIX BEAR ATTUNE: Type: IMPLANTABLE DEVICE | Site: KNEE | Status: FUNCTIONAL

## 2017-04-04 DEVICE — COMPONENT PAT DIA38MM POLYETH DOME CEM MEDIALIZED ATTUNE: Type: IMPLANTABLE DEVICE | Site: KNEE | Status: FUNCTIONAL

## 2017-04-04 DEVICE — COMPONENT FEM SZ 8 R KNEE POST STBL CEM ATTUNE: Type: IMPLANTABLE DEVICE | Site: KNEE | Status: FUNCTIONAL

## 2017-04-04 DEVICE — INSERT TIB SZ 8 THK6MM KNEE POST STBL FIX BEAR ATTUNE: Type: IMPLANTABLE DEVICE | Site: KNEE | Status: FUNCTIONAL

## 2017-04-04 DEVICE — (D)CEMENT BNE HV R 40GM -- DUPE USE ITEM 353850: Type: IMPLANTABLE DEVICE | Site: KNEE | Status: FUNCTIONAL

## 2017-04-04 RX ORDER — ONDANSETRON 8 MG/1
8 TABLET, ORALLY DISINTEGRATING ORAL
Status: DISCONTINUED | OUTPATIENT
Start: 2017-04-04 | End: 2017-04-06 | Stop reason: HOSPADM

## 2017-04-04 RX ORDER — SODIUM CHLORIDE 0.9 % (FLUSH) 0.9 %
5-10 SYRINGE (ML) INJECTION EVERY 8 HOURS
Status: DISCONTINUED | OUTPATIENT
Start: 2017-04-04 | End: 2017-04-06 | Stop reason: HOSPADM

## 2017-04-04 RX ORDER — ZOLPIDEM TARTRATE 5 MG/1
5 TABLET ORAL
Status: DISCONTINUED | OUTPATIENT
Start: 2017-04-04 | End: 2017-04-06 | Stop reason: HOSPADM

## 2017-04-04 RX ORDER — MIDAZOLAM HYDROCHLORIDE 1 MG/ML
INJECTION, SOLUTION INTRAMUSCULAR; INTRAVENOUS AS NEEDED
Status: DISCONTINUED | OUTPATIENT
Start: 2017-04-04 | End: 2017-04-04 | Stop reason: HOSPADM

## 2017-04-04 RX ORDER — DIPHENHYDRAMINE HCL 25 MG
25 CAPSULE ORAL
Status: DISCONTINUED | OUTPATIENT
Start: 2017-04-04 | End: 2017-04-06 | Stop reason: HOSPADM

## 2017-04-04 RX ORDER — OXYCODONE HYDROCHLORIDE 5 MG/1
5 TABLET ORAL
Status: DISCONTINUED | OUTPATIENT
Start: 2017-04-04 | End: 2017-04-06 | Stop reason: HOSPADM

## 2017-04-04 RX ORDER — ACETAMINOPHEN 500 MG
1000 TABLET ORAL EVERY 6 HOURS
Status: DISCONTINUED | OUTPATIENT
Start: 2017-04-05 | End: 2017-04-06 | Stop reason: HOSPADM

## 2017-04-04 RX ORDER — PROMETHAZINE HYDROCHLORIDE 25 MG/ML
25 INJECTION, SOLUTION INTRAMUSCULAR; INTRAVENOUS
Status: DISCONTINUED | OUTPATIENT
Start: 2017-04-04 | End: 2017-04-06 | Stop reason: HOSPADM

## 2017-04-04 RX ORDER — SODIUM CHLORIDE, SODIUM LACTATE, POTASSIUM CHLORIDE, CALCIUM CHLORIDE 600; 310; 30; 20 MG/100ML; MG/100ML; MG/100ML; MG/100ML
100 INJECTION, SOLUTION INTRAVENOUS CONTINUOUS
Status: DISCONTINUED | OUTPATIENT
Start: 2017-04-04 | End: 2017-04-04 | Stop reason: HOSPADM

## 2017-04-04 RX ORDER — MORPHINE SULFATE 10 MG/ML
INJECTION, SOLUTION INTRAMUSCULAR; INTRAVENOUS AS NEEDED
Status: DISCONTINUED | OUTPATIENT
Start: 2017-04-04 | End: 2017-04-04 | Stop reason: HOSPADM

## 2017-04-04 RX ORDER — SODIUM CHLORIDE 0.9 % (FLUSH) 0.9 %
5-10 SYRINGE (ML) INJECTION EVERY 8 HOURS
Status: DISCONTINUED | OUTPATIENT
Start: 2017-04-04 | End: 2017-04-04 | Stop reason: HOSPADM

## 2017-04-04 RX ORDER — CELECOXIB 200 MG/1
200 CAPSULE ORAL EVERY 12 HOURS
Status: DISCONTINUED | OUTPATIENT
Start: 2017-04-04 | End: 2017-04-06 | Stop reason: HOSPADM

## 2017-04-04 RX ORDER — KETOROLAC TROMETHAMINE 30 MG/ML
INJECTION, SOLUTION INTRAMUSCULAR; INTRAVENOUS AS NEEDED
Status: DISCONTINUED | OUTPATIENT
Start: 2017-04-04 | End: 2017-04-04 | Stop reason: HOSPADM

## 2017-04-04 RX ORDER — OXYCODONE HYDROCHLORIDE 5 MG/1
10 TABLET ORAL
Status: DISCONTINUED | OUTPATIENT
Start: 2017-04-04 | End: 2017-04-06 | Stop reason: HOSPADM

## 2017-04-04 RX ORDER — ASPIRIN 81 MG/1
81 TABLET ORAL EVERY 12 HOURS
Status: DISCONTINUED | OUTPATIENT
Start: 2017-04-04 | End: 2017-04-06 | Stop reason: HOSPADM

## 2017-04-04 RX ORDER — SIMVASTATIN 20 MG/1
20 TABLET, FILM COATED ORAL
Status: DISCONTINUED | OUTPATIENT
Start: 2017-04-04 | End: 2017-04-06 | Stop reason: HOSPADM

## 2017-04-04 RX ORDER — CEFAZOLIN SODIUM IN 0.9 % NACL 2 G/50 ML
2 INTRAVENOUS SOLUTION, PIGGYBACK (ML) INTRAVENOUS EVERY 8 HOURS
Status: COMPLETED | OUTPATIENT
Start: 2017-04-04 | End: 2017-04-04

## 2017-04-04 RX ORDER — BUPIVACAINE HYDROCHLORIDE 7.5 MG/ML
INJECTION, SOLUTION INTRASPINAL AS NEEDED
Status: DISCONTINUED | OUTPATIENT
Start: 2017-04-04 | End: 2017-04-04 | Stop reason: HOSPADM

## 2017-04-04 RX ORDER — FENTANYL CITRATE 50 UG/ML
100 INJECTION, SOLUTION INTRAMUSCULAR; INTRAVENOUS ONCE
Status: COMPLETED | OUTPATIENT
Start: 2017-04-04 | End: 2017-04-04

## 2017-04-04 RX ORDER — CELECOXIB 200 MG/1
200 CAPSULE ORAL ONCE
Status: COMPLETED | OUTPATIENT
Start: 2017-04-04 | End: 2017-04-04

## 2017-04-04 RX ORDER — LIDOCAINE HYDROCHLORIDE 10 MG/ML
0.3 INJECTION INFILTRATION; PERINEURAL ONCE
Status: COMPLETED | OUTPATIENT
Start: 2017-04-04 | End: 2017-04-04

## 2017-04-04 RX ORDER — SODIUM CHLORIDE 0.9 % (FLUSH) 0.9 %
5-10 SYRINGE (ML) INJECTION AS NEEDED
Status: DISCONTINUED | OUTPATIENT
Start: 2017-04-04 | End: 2017-04-04 | Stop reason: HOSPADM

## 2017-04-04 RX ORDER — AMOXICILLIN 250 MG
2 CAPSULE ORAL DAILY
Status: DISCONTINUED | OUTPATIENT
Start: 2017-04-05 | End: 2017-04-06 | Stop reason: HOSPADM

## 2017-04-04 RX ORDER — SODIUM CHLORIDE 9 MG/ML
100 INJECTION, SOLUTION INTRAVENOUS CONTINUOUS
Status: DISPENSED | OUTPATIENT
Start: 2017-04-04 | End: 2017-04-05

## 2017-04-04 RX ORDER — ACETAMINOPHEN 10 MG/ML
1000 INJECTION, SOLUTION INTRAVENOUS ONCE
Status: ACTIVE | OUTPATIENT
Start: 2017-04-04 | End: 2017-04-05

## 2017-04-04 RX ORDER — CEFAZOLIN SODIUM IN 0.9 % NACL 2 G/50 ML
2 INTRAVENOUS SOLUTION, PIGGYBACK (ML) INTRAVENOUS ONCE
Status: COMPLETED | OUTPATIENT
Start: 2017-04-04 | End: 2017-04-04

## 2017-04-04 RX ORDER — NALOXONE HYDROCHLORIDE 0.4 MG/ML
.2-.4 INJECTION, SOLUTION INTRAMUSCULAR; INTRAVENOUS; SUBCUTANEOUS
Status: DISCONTINUED | OUTPATIENT
Start: 2017-04-04 | End: 2017-04-06 | Stop reason: HOSPADM

## 2017-04-04 RX ORDER — HYDROMORPHONE HYDROCHLORIDE 1 MG/ML
1 INJECTION, SOLUTION INTRAMUSCULAR; INTRAVENOUS; SUBCUTANEOUS
Status: DISCONTINUED | OUTPATIENT
Start: 2017-04-04 | End: 2017-04-06 | Stop reason: HOSPADM

## 2017-04-04 RX ORDER — PROPOFOL 10 MG/ML
INJECTION, EMULSION INTRAVENOUS
Status: DISCONTINUED | OUTPATIENT
Start: 2017-04-04 | End: 2017-04-04 | Stop reason: HOSPADM

## 2017-04-04 RX ORDER — ROPIVACAINE HYDROCHLORIDE 2 MG/ML
INJECTION, SOLUTION EPIDURAL; INFILTRATION; PERINEURAL AS NEEDED
Status: DISCONTINUED | OUTPATIENT
Start: 2017-04-04 | End: 2017-04-04 | Stop reason: HOSPADM

## 2017-04-04 RX ORDER — FAMOTIDINE 20 MG/1
20 TABLET, FILM COATED ORAL ONCE
Status: COMPLETED | OUTPATIENT
Start: 2017-04-04 | End: 2017-04-04

## 2017-04-04 RX ORDER — MIDAZOLAM HYDROCHLORIDE 1 MG/ML
2 INJECTION, SOLUTION INTRAMUSCULAR; INTRAVENOUS
Status: COMPLETED | OUTPATIENT
Start: 2017-04-04 | End: 2017-04-04

## 2017-04-04 RX ORDER — DEXAMETHASONE SODIUM PHOSPHATE 100 MG/10ML
10 INJECTION INTRAMUSCULAR; INTRAVENOUS ONCE
Status: ACTIVE | OUTPATIENT
Start: 2017-04-05 | End: 2017-04-06

## 2017-04-04 RX ORDER — SODIUM CHLORIDE 0.9 % (FLUSH) 0.9 %
5-10 SYRINGE (ML) INJECTION AS NEEDED
Status: DISCONTINUED | OUTPATIENT
Start: 2017-04-04 | End: 2017-04-06 | Stop reason: HOSPADM

## 2017-04-04 RX ORDER — TRANEXAMIC ACID 100 MG/ML
INJECTION, SOLUTION INTRAVENOUS AS NEEDED
Status: DISCONTINUED | OUTPATIENT
Start: 2017-04-04 | End: 2017-04-04 | Stop reason: HOSPADM

## 2017-04-04 RX ORDER — METOPROLOL TARTRATE 50 MG/1
50 TABLET ORAL 2 TIMES DAILY
Status: DISCONTINUED | OUTPATIENT
Start: 2017-04-05 | End: 2017-04-06 | Stop reason: HOSPADM

## 2017-04-04 RX ORDER — AMLODIPINE BESYLATE 5 MG/1
5 TABLET ORAL DAILY
Status: DISCONTINUED | OUTPATIENT
Start: 2017-04-04 | End: 2017-04-06 | Stop reason: HOSPADM

## 2017-04-04 RX ORDER — LISINOPRIL 20 MG/1
40 TABLET ORAL DAILY
Status: DISCONTINUED | OUTPATIENT
Start: 2017-04-04 | End: 2017-04-06 | Stop reason: HOSPADM

## 2017-04-04 RX ADMIN — SODIUM CHLORIDE 100 ML/HR: 900 INJECTION, SOLUTION INTRAVENOUS at 10:00

## 2017-04-04 RX ADMIN — SODIUM CHLORIDE, SODIUM LACTATE, POTASSIUM CHLORIDE, AND CALCIUM CHLORIDE: 600; 310; 30; 20 INJECTION, SOLUTION INTRAVENOUS at 07:07

## 2017-04-04 RX ADMIN — TRANEXAMIC ACID 1000 MG: 100 INJECTION, SOLUTION INTRAVENOUS at 07:22

## 2017-04-04 RX ADMIN — SODIUM CHLORIDE, SODIUM LACTATE, POTASSIUM CHLORIDE, AND CALCIUM CHLORIDE 100 ML/HR: 600; 310; 30; 20 INJECTION, SOLUTION INTRAVENOUS at 06:05

## 2017-04-04 RX ADMIN — MIDAZOLAM HYDROCHLORIDE 2 MG: 1 INJECTION, SOLUTION INTRAMUSCULAR; INTRAVENOUS at 06:56

## 2017-04-04 RX ADMIN — MIDAZOLAM HYDROCHLORIDE 1 MG: 1 INJECTION, SOLUTION INTRAMUSCULAR; INTRAVENOUS at 07:13

## 2017-04-04 RX ADMIN — ROPIVACAINE HYDROCHLORIDE 60 ML: 2 INJECTION, SOLUTION EPIDURAL; INFILTRATION at 07:48

## 2017-04-04 RX ADMIN — OXYCODONE HYDROCHLORIDE 10 MG: 5 TABLET ORAL at 21:19

## 2017-04-04 RX ADMIN — CEFAZOLIN 2 G: 1 INJECTION, POWDER, FOR SOLUTION INTRAMUSCULAR; INTRAVENOUS; PARENTERAL at 16:28

## 2017-04-04 RX ADMIN — LIDOCAINE HYDROCHLORIDE 0.3 ML: 10 INJECTION, SOLUTION INFILTRATION; PERINEURAL at 06:04

## 2017-04-04 RX ADMIN — SODIUM CHLORIDE, SODIUM LACTATE, POTASSIUM CHLORIDE, AND CALCIUM CHLORIDE: 600; 310; 30; 20 INJECTION, SOLUTION INTRAVENOUS at 07:25

## 2017-04-04 RX ADMIN — FENTANYL CITRATE 100 MCG: 50 INJECTION, SOLUTION INTRAMUSCULAR; INTRAVENOUS at 06:56

## 2017-04-04 RX ADMIN — CELECOXIB 200 MG: 200 CAPSULE ORAL at 06:03

## 2017-04-04 RX ADMIN — FAMOTIDINE 20 MG: 20 TABLET, FILM COATED ORAL at 06:03

## 2017-04-04 RX ADMIN — TUBERCULIN PURIFIED PROTEIN DERIVATIVE 5 UNITS: 5 INJECTION, SOLUTION INTRADERMAL at 06:04

## 2017-04-04 RX ADMIN — SIMVASTATIN 20 MG: 20 TABLET, FILM COATED ORAL at 21:19

## 2017-04-04 RX ADMIN — PROPOFOL 75 MCG/KG/MIN: 10 INJECTION, EMULSION INTRAVENOUS at 07:22

## 2017-04-04 RX ADMIN — KETOROLAC TROMETHAMINE 30 MG: 30 INJECTION, SOLUTION INTRAMUSCULAR; INTRAVENOUS at 07:47

## 2017-04-04 RX ADMIN — CEFAZOLIN 2 G: 1 INJECTION, POWDER, FOR SOLUTION INTRAMUSCULAR; INTRAVENOUS; PARENTERAL at 07:07

## 2017-04-04 RX ADMIN — SODIUM CHLORIDE 1 G: 900 INJECTION, SOLUTION INTRAVENOUS at 07:48

## 2017-04-04 RX ADMIN — ASPIRIN 81 MG: 81 TABLET, COATED ORAL at 21:19

## 2017-04-04 RX ADMIN — MORPHINE SULFATE 10 MG: 10 INJECTION INTRAMUSCULAR; INTRAVENOUS; SUBCUTANEOUS at 07:48

## 2017-04-04 RX ADMIN — CELECOXIB 200 MG: 200 CAPSULE ORAL at 21:19

## 2017-04-04 RX ADMIN — MIDAZOLAM HYDROCHLORIDE 1 MG: 1 INJECTION, SOLUTION INTRAMUSCULAR; INTRAVENOUS at 08:40

## 2017-04-04 RX ADMIN — CEFAZOLIN 2 G: 1 INJECTION, POWDER, FOR SOLUTION INTRAMUSCULAR; INTRAVENOUS; PARENTERAL at 22:21

## 2017-04-04 RX ADMIN — BUPIVACAINE HYDROCHLORIDE 2 ML: 7.5 INJECTION, SOLUTION INTRASPINAL at 07:15

## 2017-04-04 NOTE — PROGRESS NOTES
Problem: Mobility Impaired (Adult and Pediatric)  Goal: *Acute Goals and Plan of Care (Insert Text)  GOALS (1-4 days):  (1.)Mr. Nia Vega will move from supine to sit and sit to supine in bed with STAND BY ASSIST.  (2.)Mr. Nia Vega will transfer from bed to chair and chair to bed with STAND BY ASSIST using the least restrictive device. (3.)Mr. Nia Vega will ambulate with STAND BY ASSIST for 200 feet with the least restrictive device. (4.)Mr. Nia Vega will ambulate up/down 4 steps with left railing with CONTACT GUARD ASSIST with device as needed. (5.)Mr. Nia Vega will increase right knee ROM to 5°-80°.  ________________________________________________________________________________________________      PHYSICAL THERAPY JOINT CAMP TKA: INITIAL ASSESSMENT, PM 4/4/2017  INPATIENT: Hospital Day: 1  [place] / Product Type: Medicare /      NAME/AGE/GENDER: Darlyn Wyatt is a 68 y.o. male        PRIMARY DIAGNOSIS:  Unilateral primary osteoarthritis, right knee [M17.11]              Procedure(s) and Anesthesia Type:     * KNEE ARTHROPLASTY TOTAL/ RIGHT/ DEPUY - Spinal (Right)  ICD-10: Treatment Diagnosis:        · Pain in Right Knee (M25.561)  · Stiffness of Right Knee, Not elsewhere classified (M25.661)  · Difficulty in walking, Not elsewhere classified (R26.2)       ASSESSMENT:      Mr. Nia Vega presents with limited rom and strength of right LE as well as decreased functional mobility and gait s/p right tka. He plans to go home with HHPT. This section established at most recent assessment   PROBLEM LIST (Impairments causing functional limitations):  1. Decreased Strength  2. Decreased ADL/Functional Activities  3. Decreased Transfer Abilities  4. Decreased Ambulation Ability/Technique  5. Decreased Balance  6. Increased Pain  7. Decreased Activity Tolerance  8.  Decreased [place] with Home Exercise Program    INTERVENTIONS PLANNED: (Benefits and precautions of physical therapy have been discussed with the patient.)  1. Bed Mobility  2. Gait Training  3. Home Exercise Program (HEP)  4. Therapeutic Exercise/Strengthening  5. Transfer Training  6. Range of Motion: active/assisted/passive  7. Therapeutic Activities  8. Group Therapy      TREATMENT PLAN: Frequency/Duration: Follow patient BID   to address above goals. Rehabilitation Potential For Stated Goals: GOOD      RECOMMENDED REHABILITATION/EQUIPMENT: (at time of discharge pending progress): Continue Skilled Therapy and Home Health: Physical Therapy. HISTORY:   History of Present Injury/Illness (Reason for Referral):  S/p right tka  Past Medical History/Comorbidities:   Mr. Bob Allison  has a past medical history of Arthritis; CAD (coronary artery disease); Cancer ([address] High cholesterol; Hypertension; and Morbid obesity ([address] He also has no past medical history of Adverse effect of anesthesia; Aneurysm ([address] Arrhythmia; Asthma; Autoimmune disease ([address] Chronic kidney disease; Chronic obstructive pulmonary disease ([address] Chronic pain; Coagulation disorder ([address] Diabetes ([address] Difficult intubation; Endocarditis; GERD (gastroesophageal reflux disease); Heart failure ([address] Ill-defined condition; Liver disease; Malignant hyperthermia due to anesthesia; Nausea & vomiting; Pseudocholinesterase deficiency; Psychiatric disorder; PUD (peptic ulcer disease); Rheumatic fever; Seizures ([address] Sleep apnea; Stroke [facility]); Thromboembolus ([address] or Thyroid disease. Mr. Bob Allison  has a past surgical history that includes coronary artery bypass graft (2005) and heart catheterization (2005).   Social History/Living Environment:   Home Environment: Private residence  Living Alone: No  Support Systems: Spouse/Significant Other/Partner  Patient Expects to be Discharged to[de-identified] Private residence  Prior Level of Function/Work/Activity:  independent   Number of Personal Factors/Comorbidities that affect the Plan of Care: 0: LOW COMPLEXITY EXAMINATION:   Most Recent Physical Functioning:      Gross Assessment  AROM: Within functional limits (left LE)  Strength: Within functional limits (left LE)         RLE AROM  R Knee Flexion: 60  R Knee Extension: 15              Bed Mobility  Supine to Sit: Additional time;Minimum assistance  Sit to Supine: Additional time;Minimum assistance     Transfers  Sit to Stand: Additional time;Minimum assistance  Stand to Sit: Additional time;Minimum assistance     Balance  Sitting: Intact  Standing: Pull to stand; With support                Weight Bearing Status  Right Side Weight Bearing: As tolerated  Distance (ft): 4 Feet (ft)  Ambulation - Level of Assistance: Additional time;Minimal assistance  Assistive Device: Walker, rolling  Speed/Peggy: Delayed  Step Length: Left shortened;Right shortened  Stance: Right decreased  Gait Abnormalities: Antalgic  Interventions: Verbal cues; Tactile cues; Safety awareness training;Manual cues      Braces/Orthotics:      Right Knee Cold  Type: Cryocuff       Body Structures Involved:  1. Bones  2. Joints  3. Muscles  4. Ligaments Body Functions Affected:  1. Movement Related Activities and Participation Affected:  1. Mobility   Number of elements that affect the Plan of Care: 1-2: LOW COMPLEXITY   CLINICAL PRESENTATION:   Presentation: Stable and uncomplicated: LOW COMPLEXITY   CLINICAL DECISION MAKIN[address] [place] [address]-PAC 6 Clicks   Basic Mobility Inpatient Short Form  How much difficulty does the patient currently have. .. Unable A Lot A Little None   1. Turning over in bed (including adjusting bedclothes, sheets and blankets)? [ ] 1   [ ] 2   [X] 3   [ ] 4   2. Sitting down on and standing up from a chair with arms ( e.g., wheelchair, bedside commode, etc.)   [ ] 1   [ ] 2   [X] 3   [ ] 4   3. Moving from lying on back to sitting on the side of the bed? [ ] 1   [ ] 2   [X] 3   [ ] 4   How much help from another person does the patient currently need. ..  Total A Lot A Little None   4. Moving to and from a bed to a chair (including a wheelchair)? [ ] 1   [ ] 2   [X] 3   [ ] 4   5. Need to walk in hospital room? [ ] 1   [ ] 2   [X] 3   [ ] 4   6. Climbing 3-5 steps with a railing? [ ] 1   [X] 2   [ ] 3   [ ] 4   © 2007, Trustees of [address] [place] [address] [address] [address], under license to [employer]. All rights reserved       Score:  Initial: 17 Most Recent: X (Date: -- )     Interpretation of Tool:  Represents activities that are increasingly more difficult (i.e. Bed mobility, Transfers, Gait). Score 24 23 22-20 19-15 14-10 9-7 6       Modifier CH CI CJ CK CL CM CN         · Mobility - Walking and Moving Around:               - CURRENT STATUS:    CK - 40%-59% impaired, limited or restricted               - GOAL STATUS:           CJ - 20%-39% impaired, limited or restricted               - D/C STATUS:                       ---------------To be determined---------------  [place] / Product Type: Medicare /       Medical Necessity:     · Patient is expected to demonstrate progress in strength, range of motion and balance to decrease assistance required with theraputic exercises and functional mobility. Reason for Services/Other Comments:  · Patient continues to require present interventions due to patient's inability to perform theraputic exercises and functional mobility independently.    Use of outcome tool(s) and clinical judgement create a POC that gives a: Clear prediction of patient's progress: LOW COMPLEXITY                 TREATMENT:   (In addition to Assessment/Re-Assessment sessions the following treatments were rendered)      Pre-treatment Symptoms/Complaints:  none  Pain: Initial:      Post Session:  0      Assessment/Reassessment only, no treatment provided today        Date:    Date:    Date:      ACTIVITY/EXERCISE AM PM AM PM AM PM   GROUP THERAPY  [ ]  [ ]  [ ]  [ ]  [ ]  [ ]   Ankle Pumps               Quad Sets               Gluteal Sets               Hip ABd/ADduction               Straight Leg Raises               Knee Slides               Short Arc Quads               Long Arc Quads               Chair Slides                               B = bilateral; AA = active assistive; A = active; P = passive       Treatment/Session Assessment:         Response to Treatment:  Pt. Did well. Education:  [X] Home Exercises  [X] Fall Precautions  [ ] Hip Precautions [ ] Going Home Video  [X] Knee/Hip Prosthesis Review  [X] Walker Management/Safety [ ] Adaptive Equipment as Needed         Interdisciplinary Collaboration:   · Occupational Therapist  · Registered Nurse     After treatment position/precautions:   · Supine in bed  · Bed/Chair-wheels locked  · Bed in low position  · Call light within reach     Compliance with Program/Exercises: Will assess as treatment progresses. Recommendations/Intent for next treatment session:  Treatment next visit will focus on increasing Mr. Norriss independence with bed mobility, transfers, gait training, strength/ROM exercises, modalities for pain, and patient education.        Total Treatment Duration:  PT Patient Time In/Time Out  Time In: 1355  Time Out: [address], PT

## 2017-04-04 NOTE — PROGRESS NOTES
Problem: Self Care Deficits Care Plan (Adult)  Goal: *Acute Goals and Plan of Care (Insert Text)  GOALS:   DISCHARGE GOALS (in preparation for going home/rehab): 3 days  1. Mr. Litzy Peña will perform one lower body dressing activity with minimal assistance required to demonstrate improved functional mobility and safety. 2. Mr. Litzy Peña will perform one lower body bathing activity with minimal assistance required to demonstrate improved functional mobility and safety. 3. Mr. Litzy Peña will perform toileting/toilet transfer with contact guard assistance to demonstrate improved functional mobility and safety. 4. Mr. Litzy Peña will perform shower transfer with contact guard assistance to demonstrate improved functional mobility and safety. JOINT CAMP OCCUPATIONAL THERAPY TKA: Initial Assessment and PM 4/4/2017  INPATIENT: Hospital Day: 1  [place] / Product Type: Medicare /      NAME/AGE/GENDER: Sadaf Brock is a 68 y.o. male        PRIMARY DIAGNOSIS:  Unilateral primary osteoarthritis, right knee [M17.11]              Procedure(s) and Anesthesia Type:     * KNEE ARTHROPLASTY TOTAL/ RIGHT/ DEPUY - Spinal (Right)  ICD-10: Treatment Diagnosis:        · Pain in Right Knee (M25.561)  · Stiffness of Right Knee, Not elsewhere classified (M25.661)  · Other lack of cordination (R27.8)       ASSESSMENT:      Mr. Litzy Peña is s/p right TKA and presents with decreased weight bearing on right LE and decreased independence with functional mobility and activities of daily living. Patient would benefit from skilled Occupational Therapy to maximize independence and safety with self-care task and functional mobility. Pt would also benefit from education on adaptive equipment and safety precautions in preparation for going home or for recommendations for post-hospital rehab program.  Patient plans for further rehab at home with home health services and good family support.   OT reviewed therapy schedule and plan of care with patient. Patient was able to transfer and preform self care skills as charted below. Patient instructed to call for assistance when needing to get up from the bed and all needs in reach. Patient verbalized understanding of call light. This section established at most recent assessment   PROBLEM LIST (Impairments causing functional limitations):  1. Decreased Strength  2. Decreased ADL/Functional Activities  3. Decreased Transfer Abilities  4. Increased Pain  5. Increased Fatigue  6. Decreased Flexibility/Joint Mobility  7. Decreased Knowledge of Precautions    INTERVENTIONS PLANNED: (Benefits and precautions of occupational therapy have been discussed with the patient.)  1. Activities of daily living training  2. Adaptive equipment training  3. Balance training  4. Clothing management  5. Donning&doffing training  6. Theraputic activity      TREATMENT PLAN: Frequency/Duration: Follow patient 1 time to address above goals. Rehabilitation Potential For Stated Goals: GOOD      RECOMMENDED REHABILITATION/EQUIPMENT: (at time of discharge pending progress): Continue Skilled Therapy and Home Health: Physical Therapy. OCCUPATIONAL PROFILE AND HISTORY:   History of Present Injury/Illness (Reason for Referral): Pt presents this date s/p (right) TKA. Past Medical History/Comorbidities:   Mr. Denzel Ryan  has a past medical history of Arthritis; CAD (coronary artery disease); Cancer ([address] High cholesterol; Hypertension; and Morbid obesity ([address] He also has no past medical history of Adverse effect of anesthesia; Aneurysm ([address] Arrhythmia; Asthma; Autoimmune disease ([address] Chronic kidney disease; Chronic obstructive pulmonary disease ([address] Chronic pain; Coagulation disorder ([address] Diabetes ([address] Difficult intubation; Endocarditis; GERD (gastroesophageal reflux disease); Heart failure ([address]  Ill-defined condition; Liver disease; Malignant hyperthermia due to anesthesia; Nausea & vomiting; Pseudocholinesterase deficiency; Psychiatric disorder; PUD (peptic ulcer disease); Rheumatic fever; Seizures ([facility] Sleep apnea; Stroke [facility]); [facility] or Thyroid disease. Mr. Devin Varela  has a past surgical history that includes coronary artery bypass graft (2005) and heart catheterization (2005). Social History/Living Environment:   Home Environment: Private residence  Living Alone: No  Support Systems: Spouse/Significant Other/Partner  Patient Expects to be Discharged to[de-identified] Private residence  Prior Level of Function/Work/Activity:  Mod I with ADLS      Number of Personal Factors/Comorbidities that affect the Plan of Care: Brief history (0):  LOW COMPLEXITY   ASSESSMENT OF OCCUPATIONAL PERFORMANCE[de-identified]   Most Recent Physical Functioning:            Patient Vitals for the past 6 hrs:       BP SpO2 O2 Flow Rate (L/min) Pulse   04/04/17 0857 97/52 97 % 2 l/min 62   04/04/17 0901 101/57 96 % 2 l/min 68   04/04/17 0906 103/55 95 % 2 l/min (!) 58   04/04/17 0920 109/59 96 % 2 l/min 63   04/04/17 0937 113/59 94 % 2 l/min (!) 59   04/04/17 0941 112/61 97 % 2 l/min 64   04/04/17 1029 149/81 98 % - 64   04/04/17 1433 - 96 % 1 l/min -                       Coordination  Fine Motor Skills-Upper: Left Intact; Right Intact  Gross Motor Skills-Upper: Left Intact; Right Intact           Mental Status  Neurologic State: Alert; Appropriate for age  Orientation Level: Appropriate for age  Cognition: Appropriate decision making; Appropriate for age attention/concentration; Appropriate safety awareness; Follows commands  Perception: Appears intact  Perseveration: No perseveration noted  Safety/Judgement: Awareness of environment; Fall prevention                    Basic ADLs (From Assessment) Complex ADLs (From Assessment)   Basic ADL  Feeding: Setup  Oral Facial Hygiene/Grooming: Supervision  Bathing: Moderate assistance  Upper Body Dressing: Supervision  Lower Body Dressing:  Moderate assistance  Toileting: Total assistance (catheter)     Grooming/Bathing/Dressing Activities of Daily Living     Cognitive Retraining  Safety/Judgement: Awareness of environment; Fall prevention                 Functional Transfers  Toilet Transfer : Minimum assistance  Shower Transfer: Minimum assistance                 Physical Skills Involved:  1. Range of Motion  2. Balance  3. Mobility Cognitive Skills Affected (resulting in the inability to perform in a timely and safe manner): 1. none Psychosocial Skills Affected:  1. Environmental Adaptations   Number of elements that affect the Plan of Care: 3-5:  MODERATE COMPLEXITY   CLINICAL DECISION MAKIN[address] [place] [address] AM-PAC 6 Clicks   Basic Mobility Inpatient Short Form  How much help from another person does the patient currently need. .. Total A Lot A Little None   1. Putting on and taking off regular lower body clothing?   [ ] 1   [X] 2   [ ] 3   [ ] 4   2. Bathing (including washing, rinsing, drying)? [ ] 1   [X] 2   [ ] 3   [ ] 4   3. Toileting, which includes using toilet, bedpan or urinal?   [X] 1   [ ] 2   [ ] 3   [ ] 4   4. Putting on and taking off regular upper body clothing?   [ ] 1   [ ] 2   [X] 3   [ ] 4   5. Taking care of personal grooming such as brushing teeth? [ ] 1   [ ] 2   [X] 3   [ ] 4   6. Eating meals? [ ] 1   [ ] 2   [ ] 3   [X] 4   © , Trustees of [address] [place] [address], under license to [employer]. All rights reserved   Score:  Initial: 15 Most Recent: X (Date: -- )     Interpretation of Tool:  Represents activities that are increasingly more difficult (i.e. Bed mobility, Transfers, Gait).        Score 24 23 22-20 19-15 14-10 9-7 6       Modifier CH CI CJ CK CL CM CN         · Self Care:               - CURRENT STATUS:    CK - 40%-59% impaired, limited or restricted               - GOAL STATUS:           CJ - 20%-39% impaired, limited or restricted               - D/C STATUS: ---------------To be determined---------------  [place] / Product Type: Medicare /       Medical Necessity:     · Patient is expected to demonstrate progress in balance, coordination and functional technique to decrease assistance required with self care and functional mobility and improve safety during self care and functional mobility. Reason for Services/Other Comments:  · Patient continues to require skilled intervention due to decreased self care and functional mobility. Use of outcome tool(s) and clinical judgement create a POC that gives a: LOW COMPLEXITY                 TREATMENT:   (In addition to Assessment/Re-Assessment sessions the following treatments were rendered)      Pre-treatment Symptoms/Complaints:  none  Pain: Initial:   Pain Intensity 1: 0  Post Session:  0/10      Assessment/Reassessment only, no treatment provided today     Treatment/Session Assessment:         Response to Treatment:  Tolerated well, plans to return home with spouse. Education:  [ ] Home Exercises  [X] Fall Precautions  [ ] Hip Precautions [ ] Going Home Video  [X] Knee/Hip Prosthesis Review  [X] Walker Management/Safety [X] Adaptive Equipment as Needed         Interdisciplinary Collaboration:   · Physical Therapist  · Occupational Therapist  · Registered Nurse     After treatment position/precautions:   · Supine in bed  · Bed/Chair-wheels locked  · Bed in low position  · Call light within reach  · RN notified  · Side rails x 3     Compliance with Program/Exercises: compliant all of the time. Recommendations/Intent for next treatment session:  Treatment next visit will focus on increasing Mr. Norriss independence with bed mobility, transfers, gait training, strength/ROM exercises, modalities for pain, and patient education.        Total Treatment Duration:  OT Patient Time In/Time Out  Time In: 1405  Time Out: [address], OT

## 2017-04-04 NOTE — ANESTHESIA PROCEDURE NOTES
Adductor canal block with ultrasound    Start time: 4/4/2017 6:56 AM  End time: 4/4/2017 7:00 AM  Performed by: Kwaku Roth  Authorized by: Kwaku Roth       Pre-procedure: Indications: at surgeon's request and post-op pain management    Preanesthetic Checklist: patient identified, risks and benefits discussed, site marked, timeout performed, anesthesia consent given and patient being monitored    Timeout Time: 06:56          Block Type:   Block Type:   Adductor canal  Laterality:  Right  Monitoring:  Continuous pulse ox, frequent vital sign checks, heart rate, oxygen and responsive to questions  Injection Technique:  Single shot  Procedures: ultrasound guided    Patient Position: supine  Prep: chlorhexidine    Location:  Mid thigh  Needle Gauge:  20 G  Needle Localization:  Ultrasound guidance  Medication Injected:  0.2%  ropivacaine  Volume (mL):  20    Assessment:  Number of attempts:  1  Injection Assessment:  Incremental injection every 5 mL, local visualized surrounding nerve on ultrasound, negative aspiration for blood, no intravascular symptoms, no paresthesia and ultrasound image on chart  Patient tolerance:  Patient tolerated the procedure well with no immediate complications

## 2017-04-04 NOTE — PERIOP NOTES
Betadine lavage:  17.5cc of betadine lot #[id number], exp.09/18,  in 500cc of . 9NS Lot #[id number], exp.  [id number] : RIGHT KNEE

## 2017-04-04 NOTE — IP AVS SNAPSHOT
Stephani  
 
 
 [address] 
[phone] Patient: Carlos Alamo MRN: [id number] DFW:6/44/1849 You are allergic to the following Allergen Reactions Pcn (Penicillins) Swelling Hand swelling after one shot of PCN 50 years ago Immunizations Administered for This Admission Name Date  
 TB Skin Test (PPD) Intradermal 4/4/2017 Recent Documentation Height Weight BMI Smoking Status 1.829 m 103 kg 30.8 kg/m2 Former Smoker Emergency Contacts Name Discharge Info Relation Home Work Mobile Tina Moscoso DISCHARGE CAREGIVER [3] Spouse [3] [phone] About your hospitalization You were admitted on:  April 4, 2017 You last received care in the:  [facility] You were discharged on:  April 6, 2017 Unit phone number:  [phone] Why you were hospitalized Your primary diagnosis was:  S/P Total Knee Arthroplasty Your diagnoses also included:  Osteoarthritis Providers Seen During Your Hospitalizations Provider Role Specialty Primary office phone Bianka Del Castillo MD Attending Provider Orthopedic Surgery [phone] Your Primary Care Physician (PCP) Primary Care Physician Office Phone Office Fax [address] [phone] Follow-up Information Follow up With Details Comments Contact Info Guillermina Doe MD  As needed [address] 
[phone] Bianka Del Castillo MD  As scheduled by office [place] [id number] [facility] [place] [id number] [phone] [facility]  Will contact you within 48 hrs [phone] Current Discharge Medication List  
  
START taking these medications Dose & Instructions Dispensing Information Comments Morning Noon Evening Bedtime  
 oxyCODONE IR 5 mg immediate release tablet Commonly known as:  Tia Flatter Your next dose is: Today Dose:  5 mg Take 1 Tab by mouth every three (3) hours as needed. Max Daily Amount: 40 mg.  
 Quantity:  60 Tab Refills:  0 CONTINUE these medications which have CHANGED Dose & Instructions Dispensing Information Comments Morning Noon Evening Bedtime  
 aspirin delayed-release 81 mg tablet What changed:   
- when to take this 
- additional instructions Your next dose is: Today Dose:  81 mg Take 1 Tab by mouth every twelve (12) hours every twelve (12) hours for 30 days. Quantity:  60 Tab Refills:  0 CONTINUE these medications which have NOT CHANGED Dose & Instructions Dispensing Information Comments Morning Noon Evening Bedtime  
 amLODIPine 5 mg tablet Commonly known as:  Ken Quick Your next dose is:  Tomorrow Dose:  5 mg Take 5 mg by mouth daily. Take / use AM day of surgery  per anesthesia protocols. Indications: hypertension Refills:  0  
     
  
   
   
   
  
 lisinopril 40 mg tablet Commonly known as:  Barbarann Plunk Your next dose is:  Tomorrow Dose:  40 mg Take 40 mg by mouth daily. Indications: hypertension Refills:  0 METAMUCIL 0.52 gram capsule Generic drug:  psyllium Your next dose is:  Tomorrow Dose:  1 Cap Take 1 Cap by mouth daily. Refills:  0  
     
  
   
   
   
  
 metoprolol tartrate 50 mg tablet Commonly known as:  LOPRESSOR Your next dose is: Today Dose:  50 mg Take 50 mg by mouth two (2) times a day. Take / use AM day of surgery  per anesthesia protocols. Refills:  0 OMEGA 3-6-9 1,200 mg Cap Generic drug:  fish,bora,flax oils-om3,6,9 #1 Your next dose is:  DO NOT TAKE X 5 WEEKS!! Dose:  2 Cap Take 2 Caps by mouth two (2) times a day. Refills:  0 simvastatin 20 mg tablet Commonly known as:  ZOCOR Your next dose is: Today Dose:  20 mg Take 20 mg by mouth daily. Take / use AM day of surgery  per anesthesia protocols. Refills:  0 STOP taking these medications   
 mupirocin 2 % ointment Commonly known as:  [facility] Where to Get Your Medications Information on where to get these meds will be given to you by the nurse or doctor. ! Ask your nurse or doctor about these medications  
  aspirin delayed-release 81 mg tablet  
 oxyCODONE IR 5 mg immediate release tablet Discharge Instructions [facility] [employer] Patient Discharge Instructions Zuly Kuhn / [id number] : 1940 Admitted 2017 Discharged: 2017 IF YOU HAVE ANY PROBLEMS ONCE YOU ARE AT HOME CALL THE FOLLOWING NUMBERS:  
Main office number: [phone] Take Home Medications · It is important that you take the medication exactly as they are prescribed. · Keep your medication in the bottles provided by the pharmacist and keep a list of the medication names, dosages, and times to be taken in your wallet. · Do not take other medications without consulting your doctor. What to do at [facility] Resume your prehospital diet. If you have excessive nausea or vomitting call your doctor's office Home Physical Therapy is arranged. Use rolling walker when walking. Use Srinivasa Hose stockings until we see you in the office for your follow up appointment with Dr. Murtaza Middleton. Patients who have had a joint replacement should not drive until you are seen for your follow up appointment by Dr. Murtaza Middleton. When to Call - Call if you have a temperature greater then 101 
- Unable to keep food down - Loose control of your bladder or bowel function - Are unable to bear any weight  
- Need a pain medication refill DISCHARGE SUMMARY from Nurse The following personal items collected during your admission are returned to you:  
Dental Appliance: Dental Appliances: None Vision: Visual Aid: None Hearing Aid:   na 
Jewelry: Jewelry: None Clothing: Clothing: At bedside Other Valuables: Other Valuables: None Valuables sent to safe:   na 
 
PATIENT INSTRUCTIONS: 
 
After general anesthesia or intravenous sedation, for 24 hours or while taking prescription Narcotics: · Limit your activities · Do not drive and operate hazardous machinery · Do not make important personal or business decisions · Do  not drink alcoholic beverages · If you have not urinated within 8 hours after discharge, please contact your surgeon on call. Report the following to your surgeon: 
· Excessive pain, swelling, redness or odor of or around the surgical area · Temperature over 101 · Nausea and vomiting lasting longer than 4 hours or if unable to take medications · Any signs of decreased circulation or nerve impairment to extremity: change in color, persistent  numbness, tingling, coldness or increase pain · Any questions, call office @ [phone] Keep scheduled follow up appointment. If need to change, call office @ [phone]. *  Please give a list of your current medications to your Primary Care Provider. *  Please update this list whenever your medications are discontinued, doses are 
    changed, or new medications (including over-the-counter products) are added. *  Please carry medication information at all times in case of emergency situations. Total Knee Replacement: What to Expect at Home Your Recovery When you leave the hospital, you should be able to move around with a walker or crutches. But you will need someone to help you at home for the next few weeks or until you have more energy and can move around better. If there is no one to help you at home, you may go to a rehabilitation center. You will go home with a bandage and stitches or staples. Change the bandage as your doctor tells you to. Your doctor will remove your stitches or staples 10 to 21 days after your surgery. You may still have some mild pain, and the area may be swollen for 3 to 6 months after surgery. Your knee will continue to improve for 6 to 12 months. You will probably use a walker for 1 to 3 weeks and then use crutches. When you are ready, you can use a cane. You will probably be able to walk on your own in 4 to 8 weeks. You will need to do months of physical rehabilitation (rehab) after a knee replacement. Rehab will help you strengthen the muscles of the knee and help you regain movement. After you recover, your artificial knee will allow you to do normal daily activities with less pain or no pain at all. You may be able to hike, dance, ride a bike, and play golf. Talk to your doctor about whether you can do more strenuous activities. Always tell your caregivers that you have an artificial knee. How long it will take to walk on your own, return to normal activities, and go back to work depends on your health and how well your rehabilitation (rehab) program goes. The better you do with your rehab exercises, the quicker you will get your strength and movement back. This care sheet gives you a general idea about how long it will take for you to recover. But each person recovers at a different pace. Follow the steps below to get better as quickly as possible. How can you care for yourself at home? Activity · Rest when you feel tired. You may take a nap, but do not stay in bed all day. When you sit, use a chair with arms. You can use the arms to help you stand up. · Work with your physical therapist to find the best way to exercise. You may be able to take frequent, short walks using crutches or a walker.  What you can do as your knee heals will depend on whether your new knee is cemented or uncemented. You may not be able to do certain things for a while if your new knee is uncemented. · After your knee has healed enough, you can do more strenuous activities with caution. ¨ You can golf, but use a golf cart, and do not wear shoes with spikes. ¨ You can bike on a flat road or on a stationary bike. Avoid biking up hills. ¨ Your doctor may suggest that you stay away from activities that put stress on your knee. These include tennis or badminton, squash or racquetball, contact sports like football, jumping (such as in basketball), jogging, or running. ¨ Avoid activities where you might fall. These include horseback riding, skiing, and mountain biking. · Do not sit for more than 1 hour at a time. Get up and walk around for a while before you sit again. If you must sit for a long time, prop up your leg with a chair or footstool. This will help you avoid swelling. · Ask your doctor when you can shower. You may need to take sponge baths until your stitches or staples have been removed. · Ask your doctor when you can drive again. It may take up to 8 weeks after knee replacement surgery before it is safe for you to drive. · When you get into a car, sit on the edge of the seat. Then pull in your legs, and turn to face the front. · You should be able to do many everyday activities 3 to 6 weeks after your surgery. You will probably need to take 4 to 16 weeks off from work. When you can go back to work depends on the type of work you do and how you feel. · Ask your doctor when it is okay for you to have sex. · Do not lift anything heavier than 10 pounds and do not lift weights for 12 weeks. Diet · By the time you leave the hospital, you should be eating your normal diet. If your stomach is upset, try bland, low-fat foods like plain rice, broiled chicken, toast, and yogurt. Your doctor may suggest that you take iron and vitamin supplements. · Drink plenty of fluids (unless your doctor tells you not to). · Eat healthy foods, and watch your portion sizes. Try to stay at your ideal weight. Too much weight puts more stress on your new knee. · You may notice that your bowel movements are not regular right after your surgery. This is common. Try to avoid constipation and straining with bowel movements. You may want to take a fiber supplement every day. If you have not had a bowel movement after a couple of days, ask your doctor about taking a mild laxative. Medicines · Your doctor will tell you if and when you can restart your medicines. He or she will also give you instructions about taking any new medicines. · If you take blood thinners, such as warfarin (Coumadin), clopidogrel (Plavix), or aspirin, be sure to talk to your doctor. He or she will tell you if and when to start taking those medicines again. Make sure that you understand exactly what your doctor wants you to do. · Your doctor may give you a blood-thinning medicine to prevent blood clots. If you take a blood thinner, be sure you get instructions about how to take your medicine safely. Blood thinners can cause serious bleeding problems. This medicine could be in pill form or as a shot (injection). If a shot is necessary, your doctor will tell you how to do this. · Be safe with medicines. Take pain medicines exactly as directed. ¨ If the doctor gave you a prescription medicine for pain, take it as prescribed. ¨ If you are not taking a prescription pain medicine, ask your doctor if you can take an over-the-counter medicine. ¨ Plan to take your pain medicine 30 minutes before exercises. It is easier to prevent pain before it starts than to stop it once it has started. · If you think your pain medicine is making you sick to your stomach: 
¨ Take your medicine after meals (unless your doctor has told you not to). ¨ Ask your doctor for a different pain medicine. · If your doctor prescribed antibiotics, take them as directed. Do not stop taking them just because you feel better. You need to take the full course of antibiotics. Incision care · You will have a bandage over the cut (incision) and staples or stitches. Take the bandage off when your doctor says it is okay. · Your doctor will remove the staples or stitches 10 days to 3 weeks after the surgery and replace them with strips of tape. Leave the tape on for a week or until it falls off. Exercise · Your rehab program will give you a number of exercises to do to help you get back your knee's range of motion and strength. Always do them as your therapist tells you. Ice and elevation · For pain and swelling, put ice or a cold pack on the area for 10 to 20 minutes at a time. Put a thin cloth between the ice and your skin. Other instructions · Continue to wear your support stockings as your doctor says. These help to prevent blood clots. The length of time that you will have to wear them depends on your activity level and the amount of swelling. · Wear medical alert jewelry that says you may need antibiotics before any procedure, including dental work. You can buy this at most [employer]. · You have metal pieces in your knee. These may set off some airport metal detectors. Carry a medical alert card that says you have an artificial joint, just in case. Follow-up care is a key part of your treatment and safety. Be sure to make and go to all appointments, and call your doctor if you are having problems. It's also a good idea to know your test results and keep a list of the medicines you take. When should you call for help? Call 911 anytime you think you may need emergency care. For example, call if: 
· You passed out (lost consciousness). · You have severe trouble breathing. · You have sudden chest pain and shortness of breath, or you cough up blood. Call your doctor now or seek immediate medical care if: · You have signs of infection, such as: 
¨ Increased pain, swelling, warmth, or redness. ¨ Red streaks leading from the incision. ¨ Pus draining from the incision. ¨ A fever. · You have signs of a blood clot, such as: 
¨ Pain in your calf, back of the knee, thigh, or groin. ¨ Redness and swelling in your leg or groin. · Your incision comes open and begins to bleed, or the bleeding increases. · You have pain that does not get better after you take pain medicine. Watch closely for changes in your health, and be sure to contact your doctor if: 
· You do not have a bowel movement after taking a laxative. Where can you learn more? Go to http://annie-kelvin.info/. Enter I845 in the search box to learn more about \"Total Knee Replacement: What to Expect at Home. \" Current as of: August 4, 2016 Content Version: 11.2 © 6750-1947 [employer]. Care instructions adapted under license by [employer] (which disclaims liability or warranty for this information). If you have questions about a medical condition or this instruction, always ask your healthcare professional. [place] [address] any warranty or liability for your use of this information. These are general instructions for a healthy lifestyle: No smoking/ No tobacco products/ Avoid exposure to second hand smoke Surgeon General's Warning:  Quitting smoking now greatly reduces serious risk to your health. Obesity, smoking, and sedentary lifestyle greatly increases your risk for illness A healthy diet, regular physical exercise & weight monitoring are important for maintaining a healthy lifestyle You may be retaining fluid if you have a history of heart failure or if you experience any of the following symptoms:  Weight gain of 3 pounds or more overnight or 5 pounds in a week, increased swelling in our hands or feet or shortness of breath while lying flat in bed.   Please call your doctor as soon as you notice any of these symptoms; do not wait until your next office visit. Recognize signs and symptoms of STROKE: 
 
F-face looks uneven A-arms unable to move or move even S-speech slurred or non-existent T-time-call 911 as soon as signs and symptoms begin-DO NOT go Back to bed or wait to see if you get better-TIME IS BRAIN. The discharge information has been reviewed with the patient. The patient verbalized understanding. Information obtained by : 
I understand that if any problems occur once I am at home I am to contact my physician. I understand and acknowledge receipt of the instructions indicated above. Physician's or R.N.'s Signature                                                                  Date/Time Patient or Representative Signature                                                          Date/Time Discharge Orders None [facility] Announcement We are excited to announce that we are making your provider's discharge notes available to you in [facility]. You will see these notes when they are completed and signed by the physician that discharged you from your recent hospital stay. If you have any questions or concerns about any information you see in [facility], please call the Health Information Department where you were seen or reach out to your Primary Care Provider for more information about your plan of care. Introducing [facility]! Madelin Bravo introduces [employer] patient portal. Now you can access parts of your medical record, email your doctor's office, and request medication refills online.    
 
1. In your internet browser, go to [email or web address] 2. Click on the First Time User? Click Here link in the Sign In box. You will see the New Member Sign Up page. 3. Enter your [employer] Access Code exactly as it appears below. You will not need to use this code after youve completed the sign-up process. If you do not sign up before the expiration date, you must request a new code. · [employer] Access Code: [email or web address] Expires: 5/23/2017 10:11 AM 
 
4. Enter the last four digits of your Social Security Number (xxxx) and Date of Birth (mm/dd/yyyy) as indicated and click Submit. You will be taken to the next sign-up page. 5. Create a [employer] ID. This will be your [employer] login ID and cannot be changed, so think of one that is secure and easy to remember. 6. Create a [employer] password. You can change your password at any time. 7. Enter your Password Reset Question and Answer. This can be used at a later time if you forget your password. 8. Enter your e-mail address. You will receive e-mail notification when new information is available in [address] 9. Click Sign Up. You can now view and download portions of your medical record. 10. Click the Download Summary menu link to download a portable copy of your medical information. If you have questions, please visit the Frequently Asked Questions section of the [employer] website. Remember, [employer] is NOT to be used for urgent needs. For medical emergencies, dial 911. Now available from your iPhone and Android! General Information Please provide this summary of care documentation to your next provider. Patient Signature:  ____________________________________________________________ Date:  ____________________________________________________________  
  
Ruperto Miki Provider Signature:  ____________________________________________________________ Date:  ____________________________________________________________

## 2017-04-04 NOTE — PROGRESS NOTES
TRANSFER - IN REPORT:    Verbal report received from [facility]) on Monroe Lefort  being received from [facility]) for routine post - op      Report consisted of patients Situation, Background, Assessment and   Recommendations(SBAR). Information from the following report(s) SBAR, Kardex, OR Summary, Procedure Summary, Intake/Output and MAR was reviewed with the receiving nurse. Opportunity for questions and clarification was provided. Assessment completed upon patients arrival to unit and care assumed.

## 2017-04-04 NOTE — PERIOP NOTES
TRANSFER - OUT REPORT:    Verbal report given to GALLO Perea on Dasha Sees  being transferred to room 313for routine progression of care       Report consisted of patients Situation, Background, Assessment and   Recommendations(SBAR). Information from the following report(s) SBAR, OR Summary, Procedure Summary, Intake/Output and MAR was reviewed with the receiving nurse. Opportunity for questions and clarification was provided.       Patient transported with:   O2 @ 2 liters

## 2017-04-04 NOTE — ANESTHESIA PROCEDURE NOTES
Spinal Block    Start time: 4/4/2017 7:17 AM  End time: 4/4/2017 7:20 AM  Performed by: Prashant Cassidy  Authorized by: Prashant Cassidy     Pre-procedure:   Indications: at surgeon's request and post-op pain management  Preanesthetic Checklist: patient identified, risks and benefits discussed, anesthesia consent, site marked, patient being monitored and timeout performed    Timeout Time: 07:17          Spinal Block:   Patient Position:  Seated  Prep Region:  Lumbar  Prep: chlorhexidine and patient draped      Location:  L2-3    Local:  Lidocaine 1%  Local Dose (mL):  3    Needle:   Needle Type:  Quincke  Needle Gauge:  25 G  Attempts:  1      Events: CSF confirmed, no blood with aspiration and no paresthesia        Assessment:  Insertion:  Uncomplicated  Patient tolerance:  Patient tolerated the procedure well with no immediate complications

## 2017-04-04 NOTE — OP NOTES
[facility]  Cemented Total Knee Arthroplasty  Patient:Anjum Lira   : 1940  Medical Record [id number]  Pre-operative Diagnosis:  Unilateral primary osteoarthritis, right knee [M17.11]  Post-operative Diagnosis: Unilateral primary osteoarthritis, right knee [M17.11]    Surgeon: Naveen Jara MD  Assistant: Fernando Castro PA-C    Anesthesia: Spinal    Procedure: Total Knee Arthroplasty with use of Bone Cement  The complexity of the total joint surgery requires the use of a first assistant for positioning, retraction and assistance in closure. The patient's Body mass index is 30.8 kg/(m^2). , BMI's greater then 40 make surgical exposure and retraction extremely difficult and increase operative time. Tourniquet Time: none  EBL: 150cc  Additional Findings: Severe DJD  Releases none    Kimberly Miller was brought to the operating room and positioned on the operating table. He was anethestized  A chavez catheter was placed preoperatively and IV antibiotics was administered. Prior to the incision being made a timeout was called identifying the patient, procedure ,operative side and surgeon. . The right leg was prepped and draped in the usual sterile manner  An anterior longitudinal incision was accomplished just medial to the tibial tubercle and extending approximal 6 centimeters proximal to the superior pole of the patella. A medial parapatellar capsular incision was performed. The medial capsular flap was elevated around to the insertion of the semimembranous tendon. The patella was everted and the knee flexed and externally rotated. The medial and external menisci were excised. The lateral half of the fat pad excised and the patella femoral ligament was released. The anterior cruciate ligament was resected and the posterior cruciate ligament was substituted. Using extramedullary instrumentation, the tibial cut was accomplished with appropriate posterior slope.   Approxiamately 2 mm of bone was removed from the low side of the tibia. The distal femur was next addressed. A drill hole was made above the intracondylar notch. Using appropriate intramedullary instrumentation,a 6 degree valgus distal cut was accomplished. A femur was sized. The anterior and posterior cuts were then made about the distal femur. The osteophytes were removed from the tibial and femoral surfaces. The flexion and extension gaps were assessed with the appropriate spacer blocks. Additional surgical procedures included none. The flexion and extension gaps were deemed appropriately balanced. The appropriate cutting blocks were then utilized to perform the anterior chamfer, posterior chamfer and notch cuts, with appropriate lateral tranlation accomplished for the patellofemoral groove. The tibia was sized. The tibial base plate was pinned into place with the appropriate external rotation and stem site prepared. A preliminary range of motion was accomplished with the above size trial components. A polyethylene insert allowed the patient to obtain full extension as well as appropriate flexion. The patient's ligaments were stable in flexion and extension to medial and lateral stressing and the alignment was through the appropriate mechanical axis. The patella was then everted. The bone was resected appropriately for a pegged patella button. A trial reduction revealed appropriate tracking through the patellofemoral groove. All trial components were removed and the cut surfaces prepared for cementing with irrigation and debridement of the bone interstices. There were no femoral deficiencies. There were no tibial deficiencies. No augmentation was utilized. The implants were cemented into position and pressurized in the usual fashion. Bone and cement debris were meticulously removed. The betadine lavage protocol was used.     [facility] knee was placed through range of motion and noted to be stable as mentioned above with the trail components. The wound was dry, therefore no drain was used. The operative knee was injected with 60cc of Naropin, 10 cc's of morphine and 1 cc of 30mg of Toradol. The capsular layer was closed using a #1 vicryl suture, while subcutaneous layers were closed using 2-0 Vicryl interrupted sutures. Finally the skin was closed using 3-0 Vicryl and skin staples, which were applied in occlusive fashion and sterile bandage applied. An Iceman cryo pad was applied on the operative leg. Sponge count and needle counts were correct. Chucho Chahal left the operating room     Implants:   Implant Name Type Inv.  Item Serial No.  Lot No. LRB No. Used   CEMENT BNE HV R 40GM -- PALACOS - [id number]  CEMENT BNE HV R 40GM -- PALACOS [id number] [employer] [id number] Right 2   FEM PS SZ 8 RT OSMANI -- ATTUNE - [id number]  FEM PS SZ 8 RT OSMANI -- ATTUNE [id number] [facility] [employer] [id number] Right 1   BASE TIB FB SZ 7 OSMANI -- ATTUNE - [id number]  BASE TIB FB SZ 7 OSMANI -- ATTUNE [id number] [facility] [employer] [id number] Right 1   PAT OSMANI DOME MEDIAL 38MM -- ATTUNE - [id number]  PAT OSMANI DOME MEDIAL 38MM -- ATTUNE [id number] [facility] [employer] [id number] Right 1   INSERT TIB FB PS SZ 8 6MM -- ATTUNE - [id number]   INSERT TIB FB PS SZ 8 6MM -- ATTUNE [id number] [facility] [employer] [id number] Right 1     Signed By: Kendrick Adams MD

## 2017-04-04 NOTE — PERIOP NOTES
TRANSFER - OUT REPORT:    Verbal report given to Tiny(name) on Kyaw Mora  being transferred to block area(unit) for routine progression of care       Report consisted of patients Situation, Background, Assessment and   Recommendations(SBAR). Information from the following report(s) SBAR and MAR was reviewed with the receiving nurse. Lines:   Peripheral IV 04/04/17 Left Hand (Active)   Site Assessment Clean, dry, & intact 4/4/2017  5:47 AM   Phlebitis Assessment 0 4/4/2017  5:47 AM   Infiltration Assessment 0 4/4/2017  5:47 AM   Dressing Status Clean, dry, & intact 4/4/2017  5:47 AM   Dressing Type Tape;Transparent 4/4/2017  5:47 AM   Hub Color/Line Status Green; Infusing 4/4/2017  5:47 AM   Action Taken Blood drawn 4/4/2017  5:47 AM        Opportunity for questions and clarification was provided.       Patient transported with:   Tech   BS=98

## 2017-04-04 NOTE — ANESTHESIA PREPROCEDURE EVALUATION
Anesthetic History   No history of anesthetic complications            Review of Systems / Medical History  Patient summary reviewed and pertinent labs reviewed    Pulmonary  Within defined limits                 Neuro/Psych   Within defined limits           Cardiovascular    Hypertension          CAD and CABG    Exercise tolerance: >4 METS  Comments: Stress test with normal EF and no reversible ischemia   GI/Hepatic/Renal                Endo/Other        Obesity     Other Findings              Physical Exam    Airway  Mallampati: II  TM Distance: 4 - 6 cm  Neck ROM: normal range of motion   Mouth opening: Normal     Cardiovascular    Rhythm: regular  Rate: normal         Dental      Comments: Chipped two front incisors   Pulmonary  Breath sounds clear to auscultation               Abdominal         Other Findings            Anesthetic Plan    ASA: 2  Anesthesia type: spinal      Post-op pain plan if not by surgeon: peripheral nerve block single      Anesthetic plan and risks discussed with: Patient

## 2017-04-04 NOTE — INTERVAL H&P NOTE
H&P Update:  Zandra Moran was seen and examined. History and physical has been reviewed. The patient has been examined.  There have been no significant clinical changes since the completion of the originally dated History and Physical.    Signed By: JESSICA Watkins     April 4, 2017 6:58 AM

## 2017-04-04 NOTE — ANESTHESIA POSTPROCEDURE EVALUATION
Post-Anesthesia Evaluation and Assessment    Patient: Kristofer Guerrero MRN: [id number]  SSN: [id number]    YOB: 1940  Age: 68 y.o. Sex: male       Cardiovascular Function/Vital Signs  Visit Vitals    /59    Pulse 63    Temp 36.7 °C (98.1 °F)    Resp 20    Ht 6' (1.829 m)    Wt 103 kg (227 lb 1.2 oz)    SpO2 96%    BMI 30.8 kg/m2       Patient is status post spinal, regional anesthesia for Procedure(s):  KNEE ARTHROPLASTY TOTAL/ RIGHT/ DEPUY. Nausea/Vomiting: None    Postoperative hydration reviewed and adequate. Pain:  Pain Scale 1: Visual (04/04/17 0852)  Pain Intensity 1: 0 (04/04/17 0852)   Managed    Neurological Status:   Neuro (WDL): Exceptions to WDL (04/04/17 1287)  Neuro  Neurologic State: Eyes open to stimulus (04/04/17 0852)   Block resolving    Mental Status and Level of Consciousness: Awake, alert    Pulmonary Status:   O2 Device: Nasal cannula (04/04/17 0852)   Adequate oxygenation and airway patent    Complications related to anesthesia: None    Post-anesthesia assessment completed.  No concerns    Signed By: Marcello Gomez MD     April 4, 2017

## 2017-04-04 NOTE — ROUTINE PROCESS
TRANSFER - IN REPORT:    Verbal report received from Delmi Sanchez RN on Javon May  being received from [facility] for routine progression of care      Report consisted of patients Situation, Background, Assessment and   Recommendations(SBAR). Information from the following report(s) SBAR was reviewed with the receiving nurse. Opportunity for questions and clarification was provided. Assessment completed upon patients arrival to unit and care assumed.

## 2017-04-05 PROBLEM — Z96.659 S/P TOTAL KNEE ARTHROPLASTY: Status: ACTIVE | Noted: 2017-04-05

## 2017-04-05 LAB
ANION GAP BLD CALC-SCNC: 6 MMOL/L (ref 7–16)
BUN SERPL-MCNC: 23 MG/DL (ref 8–23)
CALCIUM SERPL-MCNC: 8.6 MG/DL (ref 8.3–10.4)
CHLORIDE SERPL-SCNC: 108 MMOL/L (ref 98–107)
CO2 SERPL-SCNC: 26 MMOL/L (ref 21–32)
CREAT SERPL-MCNC: 0.82 MG/DL (ref 0.8–1.5)
GLUCOSE SERPL-MCNC: 108 MG/DL (ref 65–100)
HGB BLD-MCNC: 12.5 G/DL (ref 13.6–17.2)
POTASSIUM SERPL-SCNC: 5.1 MMOL/L (ref 3.5–5.1)
SODIUM SERPL-SCNC: 140 MMOL/L (ref 136–145)

## 2017-04-05 PROCEDURE — 97110 THERAPEUTIC EXERCISES: CPT

## 2017-04-05 PROCEDURE — 94760 N-INVAS EAR/PLS OXIMETRY 1: CPT

## 2017-04-05 PROCEDURE — 97116 GAIT TRAINING THERAPY: CPT

## 2017-04-05 PROCEDURE — 94762 N-INVAS EAR/PLS OXIMTRY CONT: CPT

## 2017-04-05 PROCEDURE — 85018 HEMOGLOBIN: CPT | Performed by: ORTHOPAEDIC SURGERY

## 2017-04-05 PROCEDURE — 80048 BASIC METABOLIC PNL TOTAL CA: CPT | Performed by: ORTHOPAEDIC SURGERY

## 2017-04-05 PROCEDURE — 74011250637 HC RX REV CODE- 250/637: Performed by: ORTHOPAEDIC SURGERY

## 2017-04-05 PROCEDURE — 65270000029 HC RM PRIVATE

## 2017-04-05 PROCEDURE — 97150 GROUP THERAPEUTIC PROCEDURES: CPT

## 2017-04-05 PROCEDURE — 74011250636 HC RX REV CODE- 250/636: Performed by: ORTHOPAEDIC SURGERY

## 2017-04-05 PROCEDURE — 36415 COLL VENOUS BLD VENIPUNCTURE: CPT | Performed by: ORTHOPAEDIC SURGERY

## 2017-04-05 RX ORDER — OXYCODONE HYDROCHLORIDE 5 MG/1
5 TABLET ORAL
Qty: 60 TAB | Refills: 0 | Status: SHIPPED | OUTPATIENT
Start: 2017-04-05

## 2017-04-05 RX ORDER — ASPIRIN 81 MG/1
81 TABLET ORAL EVERY 12 HOURS
Qty: 60 TAB | Refills: 0 | Status: SHIPPED | OUTPATIENT
Start: 2017-04-05 | End: 2017-05-05

## 2017-04-05 RX ADMIN — LISINOPRIL 40 MG: 20 TABLET ORAL at 09:08

## 2017-04-05 RX ADMIN — HYDROMORPHONE HYDROCHLORIDE 1 MG: 1 INJECTION, SOLUTION INTRAMUSCULAR; INTRAVENOUS; SUBCUTANEOUS at 14:39

## 2017-04-05 RX ADMIN — OXYCODONE HYDROCHLORIDE 10 MG: 5 TABLET ORAL at 05:30

## 2017-04-05 RX ADMIN — METOPROLOL TARTRATE 50 MG: 50 TABLET ORAL at 09:08

## 2017-04-05 RX ADMIN — PSYLLIUM HUSK 1 PACKET: 3.4 POWDER ORAL at 09:07

## 2017-04-05 RX ADMIN — CELECOXIB 200 MG: 200 CAPSULE ORAL at 09:08

## 2017-04-05 RX ADMIN — SIMVASTATIN 20 MG: 20 TABLET, FILM COATED ORAL at 19:43

## 2017-04-05 RX ADMIN — SENNOSIDES AND DOCUSATE SODIUM 2 TABLET: 8.6; 5 TABLET ORAL at 09:08

## 2017-04-05 RX ADMIN — OXYCODONE HYDROCHLORIDE 10 MG: 5 TABLET ORAL at 23:34

## 2017-04-05 RX ADMIN — ACETAMINOPHEN 1000 MG: 500 TABLET, FILM COATED ORAL at 05:29

## 2017-04-05 RX ADMIN — OXYCODONE HYDROCHLORIDE 10 MG: 5 TABLET ORAL at 09:08

## 2017-04-05 RX ADMIN — METOPROLOL TARTRATE 50 MG: 50 TABLET ORAL at 18:01

## 2017-04-05 RX ADMIN — ASPIRIN 81 MG: 81 TABLET, COATED ORAL at 19:44

## 2017-04-05 RX ADMIN — CELECOXIB 200 MG: 200 CAPSULE ORAL at 19:44

## 2017-04-05 RX ADMIN — ACETAMINOPHEN 1000 MG: 500 TABLET, FILM COATED ORAL at 00:50

## 2017-04-05 RX ADMIN — Medication 10 ML: at 05:30

## 2017-04-05 RX ADMIN — OXYCODONE HYDROCHLORIDE 10 MG: 5 TABLET ORAL at 12:04

## 2017-04-05 RX ADMIN — AMLODIPINE BESYLATE 5 MG: 5 TABLET ORAL at 09:08

## 2017-04-05 RX ADMIN — ACETAMINOPHEN 1000 MG: 500 TABLET, FILM COATED ORAL at 12:04

## 2017-04-05 RX ADMIN — ACETAMINOPHEN 1000 MG: 500 TABLET, FILM COATED ORAL at 18:01

## 2017-04-05 RX ADMIN — Medication 10 ML: at 19:48

## 2017-04-05 RX ADMIN — ASPIRIN 81 MG: 81 TABLET, COATED ORAL at 09:08

## 2017-04-05 RX ADMIN — OXYCODONE HYDROCHLORIDE 10 MG: 5 TABLET ORAL at 00:50

## 2017-04-05 RX ADMIN — OXYCODONE HYDROCHLORIDE 10 MG: 5 TABLET ORAL at 18:01

## 2017-04-05 RX ADMIN — ACETAMINOPHEN 1000 MG: 500 TABLET, FILM COATED ORAL at 23:33

## 2017-04-05 NOTE — DISCHARGE SUMMARY
[address] [place] [address]  Total Joint Discharge Summary      Patient ID:  Kristal Gonzalez  [id number]  85 y.o.  1940    Admit date: 4/4/2017  Discharge date and time: 4/6/17  Admitting Physician: Earline Norris MD  Surgeon: Same  Admission Diagnoses: Unilateral primary osteoarthritis, right knee [M17.11]  Discharge Diagnoses: Principal Problem:    S/P total knee arthroplasty (4/5/2017)    Active Problems:    Osteoarthritis (4/4/2017)                                Perioperative Antibiotics: Ancef 1 to 2 mg was given depending on patient's weight. If allergic to Ancef or due to other indications, patient was given Vancomycin. Hospital Medications given:   [unfilled]  [unfilled]  [unfilled]    Discharge Medications given:  Current Discharge Medication List      START taking these medications    Details   oxyCODONE IR (ROXICODONE) 5 mg immediate release tablet Take 1 Tab by mouth every three (3) hours as needed. Max Daily Amount: 40 mg.  Qty: 60 Tab, Refills: 0         CONTINUE these medications which have CHANGED    Details   aspirin delayed-release 81 mg tablet Take 1 Tab by mouth every twelve (12) hours every twelve (12) hours for 30 days. Qty: 60 Tab, Refills: 0         CONTINUE these medications which have NOT CHANGED    Details   metoprolol tartrate (LOPRESSOR) 50 mg tablet Take 50 mg by mouth two (2) times a day. Take / use AM day of surgery  per anesthesia protocols. amLODIPine (NORVASC) 5 mg tablet Take 5 mg by mouth daily. Take / use AM day of surgery  per anesthesia protocols. Indications: hypertension      simvastatin (ZOCOR) 20 mg tablet Take 20 mg by mouth daily. Take / use AM day of surgery  per anesthesia protocols. lisinopril (PRINIVIL, ZESTRIL) 40 mg tablet Take 40 mg by mouth daily. Indications: hypertension      fish,bora,flax oils-om3,6,9 #1 (OMEGA 3-6-9) 1,200 mg cap Take 2 Caps by mouth two (2) times a day.       psyllium (METAMUCIL) 0.52 gram capsule Take 1 Cap by mouth daily. STOP taking these medications       mupirocin (BACTROBAN) 2 % ointment Comments:   Reason for Stopping:                Additional DVT Prophylaxis:  LC Hose,Plexi-Pulse    Postoperative transfusions:   none  Post Op complications: none    Hemoglobin at discharge:   Lab Results   Component Value Date/Time    HGB 12.5 04/05/2017 04:55 AM       Wound appears to be healing without any evidence of infection. Physical Therapy started on the day following surgery and progressed to independent ambulation with the aid of a walker. At the time of discharge, able to go up and down stairs and had understanding of precautions needed following surgery.       PT/OT:            Assistive Device: Walker (comment)  RLE AROM  R Knee Flexion: 60  R Knee Extension: 15             Discharged to: home    Discharge instructions:  -Rx pain medication given  - Anticoagulate with: Ecotrin 81 mg PO BID x 4 weeks  -Resume pre hospital diet             -Resume home medications per medical continuation form     -Ambulate with walker, appropriate total joint protocol  -Follow up in office as scheduled       Signed:  Susana Merino MD  4/5/2017  7:30 AM

## 2017-04-05 NOTE — PROGRESS NOTES
Orthopedic Joint Progress Note    2017  Admit Date: 2017  Admit Diagnosis: Unilateral primary osteoarthritis, right knee [M17.11]    1 Day Post-Op    Subjective:     Mami Solorio awake and alert    Review of Systems: Pertinent items are noted in HPI. Objective:     PT/OT:     PATIENT MOBILITY    Bed Mobility  Supine to Sit: Additional time, Minimum assistance  Sit to Supine: Additional time, Minimum assistance  Transfers  Sit to Stand: Additional time, Minimum assistance  Stand to Sit: Additional time, Minimum assistance      Gait  Speed/Peggy: Delayed  Step Length: Left shortened, Right shortened  Stance: Right decreased  Gait Abnormalities: Antalgic  Ambulation - Level of Assistance: Additional time, Minimal assistance  Distance (ft): 4 Feet (ft)  Assistive Device: Walker, rolling  Interventions: Verbal cues, Tactile cues, Safety awareness training, Manual cues   Weight Bearing Status  Right Side Weight Bearing: As tolerated        Vital Signs:    Blood pressure 157/84, pulse 82, temperature 97.5 °F (36.4 °C), resp. rate 18, height 6' (1.829 m), weight 103 kg (227 lb 1.2 oz), SpO2 97 %.   Temp (24hrs), Av.3 °F (36.3 °C), Min:96.3 °F (35.7 °C), Max:98.1 °F (36.7 °C)      Pain Control:   Pain Assessment  Pain Scale 1: Visual  Pain Intensity 1: 2  Pain Location 1: Knee  Pain Orientation 1: Right  Pain Description 1: Aching, Constant  Pain Intervention(s) 1: Medication (see MAR)    Meds:  Current Facility-Administered Medications   Medication Dose Route Frequency    amLODIPine (NORVASC) tablet 5 mg  5 mg Oral DAILY    lisinopril (PRINIVIL, ZESTRIL) tablet 40 mg  40 mg Oral DAILY    metoprolol tartrate (LOPRESSOR) tablet 50 mg  50 mg Oral BID    psyllium husk-aspartame (METAMUCIL FIBER) packet 1 Packet  1 Packet Oral DAILY    simvastatin (ZOCOR) tablet 20 mg  20 mg Oral QHS    0.9% sodium chloride infusion  100 mL/hr IntraVENous CONTINUOUS    sodium chloride (NS) flush 5-10 mL  5-10 mL IntraVENous Q8H    sodium chloride (NS) flush 5-10 mL  5-10 mL IntraVENous PRN    acetaminophen (TYLENOL) tablet 1,000 mg  1,000 mg Oral Q6H    celecoxib (CELEBREX) capsule 200 mg  200 mg Oral Q12H    oxyCODONE IR (ROXICODONE) tablet 5 mg  5 mg Oral Q3H PRN    HYDROmorphone (PF) (DILAUDID) injection 1 mg  1 mg IntraVENous Q3H PRN    naloxone (NARCAN) injection 0.2-0.4 mg  0.2-0.4 mg IntraVENous Q10MIN PRN    dexamethasone (DECADRON) injection 10 mg  10 mg IntraVENous ONCE    promethazine (PHENERGAN) injection 25 mg  25 mg IntraMUSCular Q6H PRN    diphenhydrAMINE (BENADRYL) capsule 25 mg  25 mg Oral Q4H PRN    senna-docusate (PERICOLACE) 8.6-50 mg per tablet 2 Tab  2 Tab Oral DAILY    zolpidem (AMBIEN) tablet 5 mg  5 mg Oral QHS PRN    aspirin delayed-release tablet 81 mg  81 mg Oral Q12H    ondansetron (ZOFRAN ODT) tablet 8 mg  8 mg Oral Q8H PRN    oxyCODONE IR (ROXICODONE) tablet 10 mg  10 mg Oral Q3H PRN        LAB:    Lab Results   Component Value Date/Time    INR 1.0 03/14/2017 12:40 PM     Lab Results   Component Value Date/Time    HGB 12.5 04/05/2017 04:55 AM    HGB 12.5 04/04/2017 08:24 PM    HGB 14.3 03/14/2017 12:40 PM       Wound Knee Right (Active)   DRESSING STATUS Clean, dry, and intact 4/4/2017  8:00 PM   DRESSING TYPE Other (Comment) 4/4/2017  8:00 PM   Number of days:1             Physical Exam:  Calves soft/ neuro intact      Assessment:      Principal Problem:    S/P total knee arthroplasty (4/5/2017)    Active Problems:    Osteoarthritis (4/4/2017)         Plan:     Continue PT/OT/Rehab  Consult: Rehab team including PT, OT, recreational therapy, and    Home soon    Patient Expects to be Discharged to[de-identified] Private residence     Signed By: Roxane Santoro MD

## 2017-04-05 NOTE — PROGRESS NOTES
Problem: Mobility Impaired (Adult and Pediatric)  Goal: *Acute Goals and Plan of Care (Insert Text)  GOALS (1-4 days):  (1.)Mr. Alicia Gonsalez will move from supine to sit and sit to supine in bed with STAND BY ASSIST. 4/5  (2.)Mr. Alicia Gonsalez will transfer from bed to chair and chair to bed with STAND BY ASSIST using the least restrictive device. 4/5  (3.)Mr. Alicia Gonsalez will ambulate with STAND BY ASSIST for 200 feet with the least restrictive device. 4/5  (4.)Mr. Alicia Gonsalez will ambulate up/down 4 steps with left railing with CONTACT GUARD ASSIST with device as needed. (5.)Mr. Alicia Gonsalez will increase right knee ROM to 5°-80°.  ________________________________________________________________________________________________      PHYSICAL THERAPY JOINT CAMP TKA: Daily Note and AM 4/5/2017  INPATIENT: Hospital Day: 2  [place] / Product Type: Medicare /      NAME/AGE/GENDER: Pam Garcia is a 68 y.o. male        PRIMARY DIAGNOSIS:  Unilateral primary osteoarthritis, right knee [M17.11]              Procedure(s) and Anesthesia Type:     * KNEE ARTHROPLASTY TOTAL/ RIGHT/ DEPUY - Spinal (Right)  ICD-10: Treatment Diagnosis:        · Pain in Right Knee (M25.561)  · Stiffness of Right Knee, Not elsewhere classified (M25.661)  · Difficulty in walking, Not elsewhere classified (R26.2)       ASSESSMENT:      Mr. Alicia Gonsalez presents with limited rom and strength of right LE as well as decreased functional mobility and gait s/p right tka. He plans to go home with HHPT. He was supine upon arrival. He walks to the gym and back using RW with CGA. He performs exercises in the gym without any problems. He was left in restroom with call light near. This section established at most recent assessment   PROBLEM LIST (Impairments causing functional limitations):  1. Decreased Strength  2. Decreased ADL/Functional Activities  3. Decreased Transfer Abilities  4. Decreased Ambulation Ability/Technique  5.  Decreased Balance  6. Increased Pain  7. Decreased Activity Tolerance  8. Decreased [place] with Home Exercise Program    INTERVENTIONS PLANNED: (Benefits and precautions of physical therapy have been discussed with the patient.)  1. Bed Mobility  2. Gait Training  3. Home Exercise Program (HEP)  4. Therapeutic Exercise/Strengthening  5. Transfer Training  6. Range of Motion: active/assisted/passive  7. Therapeutic Activities  8. Group Therapy      TREATMENT PLAN: Frequency/Duration: Follow patient BID   to address above goals. Rehabilitation Potential For Stated Goals: GOOD      RECOMMENDED REHABILITATION/EQUIPMENT: (at time of discharge pending progress): Continue Skilled Therapy and Home Health: Physical Therapy. HISTORY:   History of Present Injury/Illness (Reason for Referral):  S/p right tka  Past Medical History/Comorbidities:   Mr. Ronald Kenyon  has a past medical history of Arthritis; CAD (coronary artery disease); Cancer ([address] High cholesterol; Hypertension; and Morbid obesity ([address] He also has no past medical history of Adverse effect of anesthesia; Aneurysm ([address] Arrhythmia; Asthma; Autoimmune disease ([address] Chronic kidney disease; Chronic obstructive pulmonary disease ([address] Chronic pain; Coagulation disorder ([address] Diabetes ([address] Difficult intubation; Endocarditis; GERD (gastroesophageal reflux disease); Heart failure ([address] Ill-defined condition; Liver disease; Malignant hyperthermia due to anesthesia; Nausea & vomiting; Pseudocholinesterase deficiency; Psychiatric disorder; PUD (peptic ulcer disease); Rheumatic fever; Seizures ([address] Sleep apnea; Stroke [facility]); Thromboembolus ([address] or Thyroid disease. Mr. Ronald Kenyon  has a past surgical history that includes coronary artery bypass graft (2005) and heart catheterization (2005).   Social History/Living Environment:   Home Environment: Private residence  # Steps to Enter: 4  One/Two Story Residence: One story  Living Alone: No  Support Systems: Spouse/Significant Other/Partner  Patient Expects to be Discharged to[de-identified] Private residence  Current DME Used/Available at Home: Commode, bedside, Shower chair  Prior Level of Function/Work/Activity:  independent   Number of Personal Factors/Comorbidities that affect the Plan of Care: 0: LOW COMPLEXITY   EXAMINATION:   Most Recent Physical Functioning:                               Bed Mobility  Supine to Sit: Stand-by asssistance  Sit to Supine:  (left up in chair)     Transfers  Sit to Stand: Stand-by asssistance  Stand to Sit: Stand-by asssistance                      Weight Bearing Status  Right Side Weight Bearing: As tolerated  Distance (ft): 288 Feet (ft) (x 2)  Ambulation - Level of Assistance: Contact guard assistance  Assistive Device: Walker, rolling  Speed/Peggy: Delayed  Step Length: Left shortened;Right shortened  Stance: Right decreased  Gait Abnormalities: Antalgic  Number of Stairs Trained: 6  Stairs - Level of Assistance: Stand-by asssistance  Rail Use: Both  Interventions: Safety awareness training      Braces/Orthotics:      Right Knee Cold  Type: Cryocuff       Body Structures Involved:  1. Bones  2. Joints  3. Muscles  4. Ligaments Body Functions Affected:  1. Movement Related Activities and Participation Affected:  1. Mobility   Number of elements that affect the Plan of Care: 1-2: LOW COMPLEXITY   CLINICAL PRESENTATION:   Presentation: Stable and uncomplicated: LOW COMPLEXITY   CLINICAL DECISION MAKIN [place] [id number] AM-PAC 6 Clicks   Basic Mobility Inpatient Short Form  How much difficulty does the patient currently have. .. Unable A Lot A Little None   1. Turning over in bed (including adjusting bedclothes, sheets and blankets)? [ ] 1   [ ] 2   [X] 3   [ ] 4   2. Sitting down on and standing up from a chair with arms ( e.g., wheelchair, bedside commode, etc.)   [ ] 1   [ ] 2   [X] 3   [ ] 4   3. Moving from lying on back to sitting on the side of the bed?    [ ] 1   [ ] 2   [X] 3   [ ] 4   How much help from another person does the patient currently need. .. Total A Lot A Little None   4. Moving to and from a bed to a chair (including a wheelchair)? [ ] 1   [ ] 2   [X] 3   [ ] 4   5. Need to walk in hospital room? [ ] 1   [ ] 2   [X] 3   [ ] 4   6. Climbing 3-5 steps with a railing? [ ] 1   [X] 2   [ ] 3   [ ] 4   © 2007, Trustees of [address] [place] [address] [address] [address], under license to [employer]. All rights reserved       Score:  Initial: 17 Most Recent: X (Date: -- )     Interpretation of Tool:  Represents activities that are increasingly more difficult (i.e. Bed mobility, Transfers, Gait). Score 24 23 22-20 19-15 14-10 9-7 6       Modifier CH CI CJ CK CL CM CN         · Mobility - Walking and Moving Around:               - CURRENT STATUS:    CK - 40%-59% impaired, limited or restricted               - GOAL STATUS:           CJ - 20%-39% impaired, limited or restricted               - D/C STATUS:                       ---------------To be determined---------------  [place] / Product Type: Medicare /       Medical Necessity:     · Patient is expected to demonstrate progress in strength, range of motion and balance to decrease assistance required with theraputic exercises and functional mobility. Reason for Services/Other Comments:  · Patient continues to require present interventions due to patient's inability to perform theraputic exercises and functional mobility independently.    Use of outcome tool(s) and clinical judgement create a POC that gives a: Clear prediction of patient's progress: LOW COMPLEXITY                 TREATMENT:   (In addition to Assessment/Re-Assessment sessions the following treatments were rendered)      Pre-treatment Symptoms/Complaints:  none  Pain: Initial:   Pain Intensity 1: 2 (3/10 after therapy)  Post Session:        Gait Training (15 Minutes):  Gait training to improve and/or restore physical functioning as related to mobility. Ambulated 288 Feet (ft) (x 2) with Contact guard assistance using a Walker, rolling and minimal Safety awareness training related to their knee position and motion to promote proper body alignment. Therapeutic Exercise: (30 Minutes (group)):  Exercises per grid below to improve strength. Required minimal verbal cues to promote proper body alignment. Progressed range as indicated. Date:  4/5    Date:    Date:      ACTIVITY/EXERCISE AM PM AM PM AM PM   GROUP THERAPY  [ ]  [x ]  [ ]  [ ]  [ ]  [ ]   Ankle Pumps 15  15            Quad Sets  15  15           Gluteal Sets  15  15           Hip ABd/ADduction  15  15           Straight Leg Raises  15  15           Knee Slides  15  15           Short Arc Quads  15  15           Long Arc Quads               Chair Slides    15                           B = bilateral; AA = active assistive; A = active; P = passive       Treatment/Session Assessment:         Response to Treatment:  Pt. Did well. Education:  [X] Home Exercises  [X] Fall Precautions  [ ] Hip Precautions [ ] Going Home Video  [X] Knee/Hip Prosthesis Review  [X] Walker Management/Safety [ ] Adaptive Equipment as Needed         Interdisciplinary Collaboration:   · Physical Therapy Assistant and Registered Nurse     After treatment position/precautions:   · Up in chair, Bed/Chair-wheels locked, Bed in low position and Call light within reach     Compliance with Program/Exercises: Will assess as treatment progresses. Recommendations/Intent for next treatment session:  Treatment next visit will focus on increasing Mr. Norriss independence with bed mobility, transfers, gait training, strength/ROM exercises, modalities for pain, and patient education.        Total Treatment Duration:  PT Patient Time In/Time Out  Time In: 1300  Time Out: 600 [address] HIREN Martinez

## 2017-04-05 NOTE — PROGRESS NOTES
Care Management Interventions  Mode of Transport at Discharge: Self  Transition of Care Consult (CM Consult): [address]: Yes  Discharge Durable Medical Equipment: Yes  Physical Therapy Consult: Yes  Occupational Therapy Consult: Yes  Current Support Network: Lives with Spouse  Confirm Follow Up Transport: Family  Plan discussed with Pt/Family/Caregiver: Yes  Freedom of Choice Offered: Yes  Discharge Location  Discharge Placement: Home with home health  Patient is a 68y.o. year old male admitted for Right TKA . Patient lives with His spouse and plans to return home on discharge. Order received to arrange home health. Patient without preference towards agency. [facility]. Patient  has a bedside commode. Patient requesting we arrange a walker. [facility] to the hospital room prior to discharge. Will follow until discharge.   Yogi Teixeira

## 2017-04-05 NOTE — PROGRESS NOTES
[facility] - INPATIENT  Face to Face Encounter    Patients Name: Javon May    YOB: 1940    Ordering Physician:  Ines Goldman    Primary Diagnosis: Unilateral primary osteoarthritis, right knee [M17.11]  S/p right TKA    Date of Face to Face:   4-4-17                               Face to Face Encounter findings are related to primary reason for home care:   yes. 1. I certify that the patient needs intermittent care as follows: physical therapy: gait/stair training    2. I certify that this patient is homebound, that is: 1) patient requires the use of a walker device, special transportation, or assistance of another to leave the home; or 2) patient's condition makes leaving the home medically contraindicated; and 3) patient has a normal inability to leave the home and leaving the home requires considerable and taxing effort. Patient may leave the home for infrequent and short duration for medical reasons, and occasional absences for non-medical reasons. Homebound status is due to the following functional limitations: Patient's ambulation limited secondary to severe pain and requires the use of an assistive device and the assistance of a caregiver for safe completion. Patient with strength and ROM deficits limiting ambulation endurance requiring the use of an assistive device and the assistance of a caregiver. Patient deemed temporarily homebound secondary to increased risk for infection when leaving home and going out into the community. 3. I certify that this patient is under my care and that I, or a nurse practitioner or  [id number], or clinical nurse specialist, or certified nurse midwife, working with me, had a Face-to-Face Encounter that meets the physician Face-to-Face Encounter requirements.   The following are the clinical findings from the [address] [place] [address] encounter that support the need for skilled services and is a summary of the encounter: see hospital chart        [place] Row Eloy, [address]  4/5/2017      THE FOLLOWING TO BE COMPLETED BY THE COMMUNITY PHYSICIAN:    I concur with the findings described above from the F2F encounter that this patient is homebound and in need of a skilled service.     Certifying Physician: _____________________________________      Printed Certifying Physician Name: _____________________________________    Date: _________________

## 2017-04-05 NOTE — PROGRESS NOTES
04/04/17 2213   Oxygen Therapy   O2 Sat (%) 95 %   Pulse via Oximetry 100 beats per minute   O2 Device Room air   O2 Flow Rate (L/min) 0 l/min    Continuous sat monitor ordered QHS

## 2017-04-05 NOTE — PROGRESS NOTES
Problem: Mobility Impaired (Adult and Pediatric)  Goal: *Acute Goals and Plan of Care (Insert Text)  GOALS (1-4 days):  (1.)Mr. Alireza Anderson will move from supine to sit and sit to supine in bed with STAND BY ASSIST.  (2.)Mr. Alireza Anderson will transfer from bed to chair and chair to bed with STAND BY ASSIST using the least restrictive device. (3.)Mr. Alireza Anderson will ambulate with STAND BY ASSIST for 200 feet with the least restrictive device. (4.)Mr. Alireza Anderson will ambulate up/down 4 steps with left railing with CONTACT GUARD ASSIST with device as needed. (5.)Mr. Alireza Anderson will increase right knee ROM to 5°-80°.  ________________________________________________________________________________________________      PHYSICAL THERAPY JOINT CAMP TKA: Daily Note and AM 4/5/2017  INPATIENT: Hospital Day: 2  [place] / Product Type: Medicare /      NAME/AGE/GENDER: Sandie Hardy is a 68 y.o. male        PRIMARY DIAGNOSIS:  Unilateral primary osteoarthritis, right knee [M17.11]              Procedure(s) and Anesthesia Type:     * KNEE ARTHROPLASTY TOTAL/ RIGHT/ DEPUY - Spinal (Right)  ICD-10: Treatment Diagnosis:        · Pain in Right Knee (M25.561)  · Stiffness of Right Knee, Not elsewhere classified (M25.661)  · Difficulty in walking, Not elsewhere classified (R26.2)       ASSESSMENT:      Mr. Alireza Anderson presents with limited rom and strength of right LE as well as decreased functional mobility and gait s/p right tka. He plans to go home with HHPT. He was supine upon arrival.  He walks 60 ft using RW with CGA. He performs exercises in the chair without any problems. He has a good quad contraction and can do a SLR without knee bent. He is a little impulsive from sit to stand. He was educated on gait sequence, sit to stand and stand to sit. He will sit up for awhile. This section established at most recent assessment   PROBLEM LIST (Impairments causing functional limitations):  1.  Decreased Strength  2. Decreased ADL/Functional Activities  3. Decreased Transfer Abilities  4. Decreased Ambulation Ability/Technique  5. Decreased Balance  6. Increased Pain  7. Decreased Activity Tolerance  8. Decreased [place] with Home Exercise Program    INTERVENTIONS PLANNED: (Benefits and precautions of physical therapy have been discussed with the patient.)  1. Bed Mobility  2. Gait Training  3. Home Exercise Program (HEP)  4. Therapeutic Exercise/Strengthening  5. Transfer Training  6. Range of Motion: active/assisted/passive  7. Therapeutic Activities  8. Group Therapy      TREATMENT PLAN: Frequency/Duration: Follow patient BID   to address above goals. Rehabilitation Potential For Stated Goals: GOOD      RECOMMENDED REHABILITATION/EQUIPMENT: (at time of discharge pending progress): Continue Skilled Therapy and Home Health: Physical Therapy. HISTORY:   History of Present Injury/Illness (Reason for Referral):  S/p right tka  Past Medical History/Comorbidities:   Mr. Santos Walsh  has a past medical history of Arthritis; CAD (coronary artery disease); Cancer ([address] High cholesterol; Hypertension; and Morbid obesity ([address] He also has no past medical history of Adverse effect of anesthesia; Aneurysm ([address] Arrhythmia; Asthma; Autoimmune disease ([address] Chronic kidney disease; Chronic obstructive pulmonary disease ([address] Chronic pain; Coagulation disorder ([address] Diabetes ([address] Difficult intubation; Endocarditis; GERD (gastroesophageal reflux disease); Heart failure ([address] Ill-defined condition; Liver disease; Malignant hyperthermia due to anesthesia; Nausea & vomiting; Pseudocholinesterase deficiency; Psychiatric disorder; PUD (peptic ulcer disease); Rheumatic fever; Seizures ([address] Sleep apnea; Stroke [facility]); Thromboembolus ([address] or Thyroid disease. Mr. Santos Walsh  has a past surgical history that includes coronary artery bypass graft (2005) and heart catheterization (2005).   Social History/Living Environment:   Home Environment: Private residence  # Steps to Enter: 4  One/Two Story Residence: One story  Living Alone: No  Support Systems: Spouse/Significant Other/Partner  Patient Expects to be Discharged to[de-identified] Private residence  Current DME Used/Available at Home: Commode, bedside, Shower chair  Prior Level of Function/Work/Activity:  independent   Number of Personal Factors/Comorbidities that affect the Plan of Care: 0: LOW COMPLEXITY   EXAMINATION:   Most Recent Physical Functioning:                               Bed Mobility  Supine to Sit: Stand-by asssistance  Sit to Supine:  (left up in chair)     Transfers  Sit to Stand: Stand-by asssistance  Stand to Sit: Stand-by asssistance                      Weight Bearing Status  Right Side Weight Bearing: As tolerated  Distance (ft): 60 Feet (ft)  Ambulation - Level of Assistance: Contact guard assistance  Assistive Device: Walker, rolling  Speed/Peggy: Delayed  Step Length: Left shortened;Right shortened  Stance: Right decreased  Gait Abnormalities: Antalgic  Interventions: Safety awareness training      Braces/Orthotics:      Right Knee Cold  Type: Cryocuff       Body Structures Involved:  1. Bones  2. Joints  3. Muscles  4. Ligaments Body Functions Affected:  1. Movement Related Activities and Participation Affected:  1. Mobility   Number of elements that affect the Plan of Care: 1-2: LOW COMPLEXITY   CLINICAL PRESENTATION:   Presentation: Stable and uncomplicated: LOW COMPLEXITY   CLINICAL DECISION MAKING:   [employer] AM-PAC 6 Clicks   Basic Mobility Inpatient Short Form  How much difficulty does the patient currently have. .. Unable A Lot A Little None   1. Turning over in bed (including adjusting bedclothes, sheets and blankets)? [ ] 1   [ ] 2   [X] 3   [ ] 4   2. Sitting down on and standing up from a chair with arms ( e.g., wheelchair, bedside commode, etc.)   [ ] 1   [ ] 2   [X] 3   [ ] 4   3.   Moving from lying on back to sitting on the side of the bed? [ ] 1   [ ] 2   [X] 3   [ ] 4   How much help from another person does the patient currently need. .. Total A Lot A Little None   4. Moving to and from a bed to a chair (including a wheelchair)? [ ] 1   [ ] 2   [X] 3   [ ] 4   5. Need to walk in hospital room? [ ] 1   [ ] 2   [X] 3   [ ] 4   6. Climbing 3-5 steps with a railing? [ ] 1   [X] 2   [ ] 3   [ ] 4   © 2007, Trustees of [address] [place] [address] [address] [address], under license to [employer]. All rights reserved       Score:  Initial: 17 Most Recent: X (Date: -- )     Interpretation of Tool:  Represents activities that are increasingly more difficult (i.e. Bed mobility, Transfers, Gait). Score 24 23 22-20 19-15 14-10 9-7 6       Modifier CH CI CJ CK CL CM CN         · Mobility - Walking and Moving Around:               - CURRENT STATUS:    CK - 40%-59% impaired, limited or restricted               - GOAL STATUS:           CJ - 20%-39% impaired, limited or restricted               - D/C STATUS:                       ---------------To be determined---------------  [place] / Product Type: Medicare /       Medical Necessity:     · Patient is expected to demonstrate progress in strength, range of motion and balance to decrease assistance required with theraputic exercises and functional mobility. Reason for Services/Other Comments:  · Patient continues to require present interventions due to patient's inability to perform theraputic exercises and functional mobility independently.    Use of outcome tool(s) and clinical judgement create a POC that gives a: Clear prediction of patient's progress: LOW COMPLEXITY                 TREATMENT:   (In addition to Assessment/Re-Assessment sessions the following treatments were rendered)      Pre-treatment Symptoms/Complaints:  none  Pain: Initial:   Pain Intensity 1: 2 (2-3/10 after therapy)  Post Session:        Gait Training (15 Minutes):  Gait training to improve and/or restore physical functioning as related to mobility. Ambulated 60 Feet (ft) with Contact guard assistance using a Walker, rolling and minimal Safety awareness training related to their knee position and motion to promote proper body alignment. Therapeutic Exercise: (15 Minutes):  Exercises per grid below to improve strength. Required minimal verbal cues to promote proper body alignment. Progressed range as indicated. Date:  4/5    Date:    Date:      ACTIVITY/EXERCISE AM PM AM PM AM PM   GROUP THERAPY  [ ]  [ ]  [ ]  [ ]  [ ]  [ ]   Ankle Pumps 15              Quad Sets  15             Gluteal Sets  15             Hip ABd/ADduction  15             Straight Leg Raises  15             Knee Slides  15             Short Arc Quads  15             Long Arc Quads               Chair Slides                               B = bilateral; AA = active assistive; A = active; P = passive       Treatment/Session Assessment:         Response to Treatment:  Pt. Did well. Education:  [X] Home Exercises  [X] Fall Precautions  [ ] Hip Precautions [ ] Going Home Video  [X] Knee/Hip Prosthesis Review  [X] Walker Management/Safety [ ] Adaptive Equipment as Needed         Interdisciplinary Collaboration:   · Physical Therapy Assistant and Registered Nurse     After treatment position/precautions:   · Up in chair, Bed/Chair-wheels locked, Bed in low position and Call light within reach     Compliance with Program/Exercises: Will assess as treatment progresses. Recommendations/Intent for next treatment session:  Treatment next visit will focus on increasing Mr. Norriss independence with bed mobility, transfers, gait training, strength/ROM exercises, modalities for pain, and patient education.        Total Treatment Duration:  PT Patient Time In/Time Out  Time In: 0815  Time Out: 0845     Jenny Martinez, PTA

## 2017-04-05 NOTE — PROGRESS NOTES
Patient is A&Ox4. Able to verbalize needs. Resting quietly with no distress noted. Dressing to surgical site is dry and intact. Neurovascular and peripheral vascular checks WNL. Ramon draining clear yellow urine to bag. Denies needs. Bed low and locked. Call light within reach. Instructed to call for assistance. Patient verbalizes understanding. Will monitor.

## 2017-04-06 VITALS
RESPIRATION RATE: 18 BRPM | BODY MASS INDEX: 30.76 KG/M2 | HEIGHT: 72 IN | SYSTOLIC BLOOD PRESSURE: 181 MMHG | DIASTOLIC BLOOD PRESSURE: 90 MMHG | WEIGHT: 227.07 LBS | HEART RATE: 71 BPM | OXYGEN SATURATION: 93 % | TEMPERATURE: 97.8 F

## 2017-04-06 LAB — HGB BLD-MCNC: 12.4 G/DL (ref 13.6–17.2)

## 2017-04-06 PROCEDURE — 74011250637 HC RX REV CODE- 250/637: Performed by: ORTHOPAEDIC SURGERY

## 2017-04-06 PROCEDURE — 85018 HEMOGLOBIN: CPT | Performed by: ORTHOPAEDIC SURGERY

## 2017-04-06 PROCEDURE — 97150 GROUP THERAPEUTIC PROCEDURES: CPT

## 2017-04-06 PROCEDURE — 97116 GAIT TRAINING THERAPY: CPT

## 2017-04-06 PROCEDURE — 97535 SELF CARE MNGMENT TRAINING: CPT

## 2017-04-06 PROCEDURE — 36415 COLL VENOUS BLD VENIPUNCTURE: CPT | Performed by: ORTHOPAEDIC SURGERY

## 2017-04-06 RX ADMIN — OXYCODONE HYDROCHLORIDE 5 MG: 5 TABLET ORAL at 06:25

## 2017-04-06 RX ADMIN — ONDANSETRON 8 MG: 8 TABLET, ORALLY DISINTEGRATING ORAL at 09:43

## 2017-04-06 RX ADMIN — CELECOXIB 200 MG: 200 CAPSULE ORAL at 09:41

## 2017-04-06 RX ADMIN — OXYCODONE HYDROCHLORIDE 10 MG: 5 TABLET ORAL at 09:41

## 2017-04-06 RX ADMIN — ACETAMINOPHEN 1000 MG: 500 TABLET, FILM COATED ORAL at 06:25

## 2017-04-06 RX ADMIN — LISINOPRIL 40 MG: 20 TABLET ORAL at 09:41

## 2017-04-06 RX ADMIN — ASPIRIN 81 MG: 81 TABLET, COATED ORAL at 09:41

## 2017-04-06 RX ADMIN — PSYLLIUM HUSK 1 PACKET: 3.4 POWDER ORAL at 09:41

## 2017-04-06 RX ADMIN — METOPROLOL TARTRATE 50 MG: 50 TABLET ORAL at 09:41

## 2017-04-06 RX ADMIN — AMLODIPINE BESYLATE 5 MG: 5 TABLET ORAL at 09:41

## 2017-04-06 RX ADMIN — SENNOSIDES AND DOCUSATE SODIUM 2 TABLET: 8.6; 5 TABLET ORAL at 09:41

## 2017-04-06 NOTE — PROGRESS NOTES
04/05/17 2258   Oxygen Therapy   O2 Sat (%) 93 %   Pulse via Oximetry 69 beats per minute   O2 Device Room air   O2 Flow Rate (L/min) 0 l/min    Continuous sat monitor ordered QHS set up with old data deleted and Karina Cuevas in monitor room notified.

## 2017-04-06 NOTE — PROGRESS NOTES
Orthopedic Joint Progress Note    2017  Admit Date: 2017  Admit Diagnosis: Unilateral primary osteoarthritis, right knee [M17.11]    2 Days Post-Op    Subjective:     Martínez Domínguez awake and alert    Review of Systems: Pertinent items are noted in HPI. Objective:     PT/OT:     PATIENT MOBILITY    Bed Mobility  Supine to Sit: Stand-by asssistance  Sit to Supine:  (left up in chair)  Transfers  Sit to Stand: Stand-by asssistance  Stand to Sit: Stand-by asssistance      Gait  Speed/Peggy: Delayed  Step Length: Left shortened, Right shortened  Stance: Right decreased  Gait Abnormalities: Antalgic  Ambulation - Level of Assistance: Contact guard assistance  Distance (ft): 288 Feet (ft) (x 2)  Assistive Device: Walker, rolling  Rail Use: Both  Stairs - Level of Assistance: Stand-by asssistance  Number of Stairs Trained: 6  Interventions: Safety awareness training  Duration: 15 Minutes   Weight Bearing Status  Right Side Weight Bearing: As tolerated        Vital Signs:    Blood pressure 148/76, pulse 61, temperature 98.6 °F (37 °C), resp. rate 18, height 6' (1.829 m), weight 103 kg (227 lb 1.2 oz), SpO2 94 %.   Temp (24hrs), Av.2 °F (36.2 °C), Min:96.3 °F (35.7 °C), Max:98.6 °F (37 °C)      Pain Control:   Pain Assessment  Pain Scale 1: Numeric (0 - 10)  Pain Intensity 1: 0  Pain Location 1: Knee  Pain Orientation 1: Right  Pain Description 1: Aching, Constant  Pain Intervention(s) 1: Medication (see MAR)    Meds:  Current Facility-Administered Medications   Medication Dose Route Frequency    amLODIPine (NORVASC) tablet 5 mg  5 mg Oral DAILY    lisinopril (PRINIVIL, ZESTRIL) tablet 40 mg  40 mg Oral DAILY    metoprolol tartrate (LOPRESSOR) tablet 50 mg  50 mg Oral BID    psyllium husk-aspartame (METAMUCIL FIBER) packet 1 Packet  1 Packet Oral DAILY    simvastatin (ZOCOR) tablet 20 mg  20 mg Oral QHS    sodium chloride (NS) flush 5-10 mL  5-10 mL IntraVENous Q8H    sodium chloride (NS) flush 5-10 mL  5-10 mL IntraVENous PRN    acetaminophen (TYLENOL) tablet 1,000 mg  1,000 mg Oral Q6H    celecoxib (CELEBREX) capsule 200 mg  200 mg Oral Q12H    oxyCODONE IR (ROXICODONE) tablet 5 mg  5 mg Oral Q3H PRN    HYDROmorphone (PF) (DILAUDID) injection 1 mg  1 mg IntraVENous Q3H PRN    naloxone (NARCAN) injection 0.2-0.4 mg  0.2-0.4 mg IntraVENous Q10MIN PRN    promethazine (PHENERGAN) injection 25 mg  25 mg IntraMUSCular Q6H PRN    diphenhydrAMINE (BENADRYL) capsule 25 mg  25 mg Oral Q4H PRN    senna-docusate (PERICOLACE) 8.6-50 mg per tablet 2 Tab  2 Tab Oral DAILY    zolpidem (AMBIEN) tablet 5 mg  5 mg Oral QHS PRN    aspirin delayed-release tablet 81 mg  81 mg Oral Q12H    ondansetron (ZOFRAN ODT) tablet 8 mg  8 mg Oral Q8H PRN    oxyCODONE IR (ROXICODONE) tablet 10 mg  10 mg Oral Q3H PRN        LAB:    Lab Results   Component Value Date/Time    INR 1.0 03/14/2017 12:40 PM     Lab Results   Component Value Date/Time    HGB 12.4 04/06/2017 04:58 AM    HGB 12.5 04/05/2017 04:55 AM    HGB 12.5 04/04/2017 08:24 PM       Wound Knee Right (Active)   DRESSING STATUS Clean, dry, and intact 4/5/2017  8:00 PM   DRESSING TYPE Other (Comment) 4/5/2017  8:00 PM   Drainage Amount  None 4/5/2017  8:00 PM   Number of days:2             Physical Exam:  No significant changes    Assessment:      Principal Problem:    S/P total knee arthroplasty (4/5/2017)    Active Problems:    Osteoarthritis (4/4/2017)         Plan:     Continue PT/OT/Rehab  Consult: Rehab team including PT, OT, recreational therapy, and     Patient Expects to be Discharged to[de-identified] Private residence     Signed By: Earline Norris MD

## 2017-04-06 NOTE — PROGRESS NOTES
Problem: Self Care Deficits Care Plan (Adult)  Goal: *Acute Goals and Plan of Care (Insert Text)  GOALS:   DISCHARGE GOALS (in preparation for going home/rehab): 3 days  1. Mr. Libertad Barrios will perform one lower body dressing activity with minimal assistance required to demonstrate improved functional mobility and safety. GOAL MET 4/6/2017    2. Mr. Libertad Barrios will perform one lower body bathing activity with minimal assistance required to demonstrate improved functional mobility and safety. GOAL MET 4/6/2017    3. Mr. Libertad Barrios will perform toileting/toilet transfer with contact guard assistance to demonstrate improved functional mobility and safety. GOAL MET 4/6/2017    4. Mr. Libertad Barrios will perform shower transfer with contact guard assistance to demonstrate improved functional mobility and safety. GOAL MET 4/6/2017        JOINT CAMP OCCUPATIONAL THERAPY TKA: Daily Note and AM 4/6/2017  INPATIENT: Hospital Day: 3  [place] / [place]: [place] / Product Type: Medicare /      NAME/AGE/GENDER: Rodriguez Suggs is a 68 y.o. male        PRIMARY DIAGNOSIS:  Unilateral primary osteoarthritis, right knee [M17.11]              Procedure(s) and Anesthesia Type:     * KNEE ARTHROPLASTY TOTAL/ RIGHT/ DEPUY - Spinal (Right)  ICD-10: Treatment Diagnosis:        · Pain in Right Knee (M25.561)  · Stiffness of Right Knee, Not elsewhere classified (M25.661)  · Other lack of cordination (R27.8)       ASSESSMENT:      Mr. Libertad Barrios is s/p right TKA and presents with decreased weight bearing on right LE and decreased independence with functional mobility and activities of daily living. Patient completed shower and dressing as charter below in ADL grid and is ambulating with rolling walker and stand by assist.  Patient has met 4/4 goals and plans to return home with good family support. Family able to provide patient with appropriate level of assistance at this time.   OT reviewed safety precautions throughout session and therapy schedule for the remainder of today. Patient instructed to call for assistance when needing to get up from recliner and all needs in reach. Patient verbalized understanding of call light. This section established at most recent assessment   PROBLEM LIST (Impairments causing functional limitations):  1. Decreased Strength  2. Decreased ADL/Functional Activities  3. Decreased Transfer Abilities  4. Increased Pain  5. Increased Fatigue  6. Decreased Flexibility/Joint Mobility  7. Decreased Knowledge of Precautions    INTERVENTIONS PLANNED: (Benefits and precautions of occupational therapy have been discussed with the patient.)  1. Activities of daily living training  2. Adaptive equipment training  3. Balance training  4. Clothing management  5. Donning&doffing training  6. Theraputic activity      TREATMENT PLAN: Frequency/Duration: Follow patient 1 time to address above goals. Rehabilitation Potential For Stated Goals: GOOD      RECOMMENDED REHABILITATION/EQUIPMENT: (at time of discharge pending progress): Continue Skilled Therapy and Home Health: Physical Therapy. OCCUPATIONAL PROFILE AND HISTORY:   History of Present Injury/Illness (Reason for Referral): Pt presents this date s/p (right) TKA. Past Medical History/Comorbidities:   Mr. Denzel Ryan  has a past medical history of Arthritis; CAD (coronary artery disease); Cancer ([address] High cholesterol; Hypertension; and Morbid obesity ([address] He also has no past medical history of Adverse effect of anesthesia; Aneurysm ([address] Arrhythmia; Asthma; Autoimmune disease ([address] Chronic kidney disease; Chronic obstructive pulmonary disease ([address] Chronic pain; Coagulation disorder ([address] Diabetes ([address] Difficult intubation; Endocarditis; GERD (gastroesophageal reflux disease); Heart failure ([address] Ill-defined condition; Liver disease; Malignant hyperthermia due to anesthesia;  Nausea & vomiting; Pseudocholinesterase deficiency; Psychiatric disorder; PUD (peptic ulcer disease); Rheumatic fever; Seizures ([address] Sleep apnea; Stroke [facility]); Thromboembolus ([address] or Thyroid disease. Mr. Bandar Jiang  has a past surgical history that includes coronary artery bypass graft (2005) and heart catheterization (2005). Social History/Living Environment:   Home Environment: Private residence  # Steps to Enter: 4  One/Two Story Residence: One story  Living Alone: No  Support Systems: Spouse/Significant Other/Partner  Patient Expects to be Discharged to[de-identified] Private residence  Current DME Used/Available at Home: [employer], bedside, [address] chair  Prior Level of Function/Work/Activity:  Mod I with ADLS      Number of Personal Factors/Comorbidities that affect the Plan of Care: Brief history (0):  LOW COMPLEXITY   ASSESSMENT OF OCCUPATIONAL PERFORMANCE[de-identified]   Most Recent Physical Functioning:            Patient Vitals for the past 6 hrs:   BP SpO2 O2 Flow Rate (L/min) Pulse   04/06/17 0809 - 93 % 0 l/min -   04/06/17 0810 181/90 93 % - 71                                   Mental Status  Neurologic State: Alert; Appropriate for age  Orientation Level: Appropriate for age  Cognition: Appropriate decision making; Appropriate for age attention/concentration; Appropriate safety awareness; Follows commands  Perception: Appears intact  Perseveration: No perseveration noted  Safety/Judgement: Awareness of environment; Fall prevention                    Basic ADLs (From Assessment) Complex ADLs (From Assessment)   Basic ADL  Feeding: Setup  Oral Facial Hygiene/Grooming: Supervision  Bathing: Moderate assistance  Upper Body Dressing: Supervision  Lower Body Dressing: Moderate assistance  Toileting:  Total assistance (catheter)     Grooming/Bathing/Dressing Activities of Daily Living   Grooming  Grooming Assistance: Supervision/set up  Washing Face: Supervision/set-up  Washing Hands: Supervision/set-up  Brushing Teeth: Supervision/set-up  Shaving: Supervision/set-up  Brushing/Combing Hair: Supervision/set-up Cognitive Retraining  Safety/Judgement: Awareness of environment; Fall prevention   Upper Body Bathing  Bathing Assistance: Supervision/set-up; Stand-by assistance  Position Performed: Standing     Lower Body Bathing  Bathing Assistance: Minimum assistance  Perineal  : Stand-by assistance  Position Performed: Standing  Lower Body : Minimum assistance  Position Performed: Seated in chair;Standing  Adaptive Equipment: Grab bar; Shower chair     Upper Body Dressing Assistance  Dressing Assistance: Supervision/set-up  Pullover Shirt: Supervision/set-up Functional Transfers  Bathroom Mobility: Stand-by assistance  Toilet Transfer : Stand-by asssistance  Shower Transfer: Stand-by asssistance   Lower Body Dressing Assistance  Dressing Assistance: Minimum assistance  Underpants: Minimum assistance  Pants With Elastic Waist: Minimum assistance  Socks: Moderate assistance  Antiembolitic Stockings: Maximum assistance             Physical Skills Involved:  1. Range of Motion  2. Balance  3. Mobility Cognitive Skills Affected (resulting in the inability to perform in a timely and safe manner): 1. none Psychosocial Skills Affected:  1. Environmental Adaptations   Number of elements that affect the Plan of Care: 3-5:  MODERATE COMPLEXITY   CLINICAL DECISION MAKIN[address] [place] [address] [address] [id number] AM-PAC 6 Clicks   Basic Mobility Inpatient Short Form  How much help from another person does the patient currently need. .. Total A Lot A Little None   1. Putting on and taking off regular lower body clothing?   [ ] 1   [X] 2   [ ] 3   [ ] 4   2. Bathing (including washing, rinsing, drying)? [ ] 1   [X] 2   [ ] 3   [ ] 4   3. Toileting, which includes using toilet, bedpan or urinal?   [X] 1   [ ] 2   [ ] 3   [ ] 4   4. Putting on and taking off regular upper body clothing?   [ ] 1   [ ] 2   [X] 3   [ ] 4   5. Taking care of personal grooming such as brushing teeth? [ ] 1   [ ] 2   [X] 3   [ ] 4   6. Eating meals?    [ ] 1   [ ] 2   [ ] 3 [X] 4   © 2007, Trustees of [address] [place] [address] [address] [address], under license to [employer]. All rights reserved   Score:  Initial: 15 Most Recent: X (Date: -- )     Interpretation of Tool:  Represents activities that are increasingly more difficult (i.e. Bed mobility, Transfers, Gait). Score 24 23 22-20 19-15 14-10 9-7 6       Modifier CH CI CJ CK CL CM CN         · Self Care:               - CURRENT STATUS:    CK - 40%-59% impaired, limited or restricted               - GOAL STATUS:           CJ - 20%-39% impaired, limited or restricted               - D/C STATUS:                       ---------------To be determined---------------  [place] / [place] / Product Type: Medicare /       Medical Necessity:     · Patient is expected to demonstrate progress in balance, coordination and functional technique to decrease assistance required with self care and functional mobility and improve safety during self care and functional mobility. Reason for Services/Other Comments:  · Patient continues to require skilled intervention due to decreased self care and functional mobility. Use of outcome tool(s) and clinical judgement create a POC that gives a: LOW COMPLEXITY                 TREATMENT:   (In addition to Assessment/Re-Assessment sessions the following treatments were rendered)      Pre-treatment Symptoms/Complaints:  none  Pain: Initial:   Pain Intensity 1: 2  Pain Location 1: Knee  Pain Orientation 1: Right  Pain Intervention(s) 1: Shower  Post Session:  2/10      Self Care: (25): Procedure(s) (per grid) utilized to improve and/or restore self-care/home management as related to dressing, bathing, toileting and grooming. Required minimal visual and verbal cueing to facilitate activities of daily living skills and compensatory activities.      Treatment/Session Assessment:         Response to Treatment:  Tolerated well, plans to return home with spouse today     Education:  [ ] Home Exercises  [X] Fall Precautions  [ ] Hip Precautions [ ] Going Home Video  [X] Knee/Hip Prosthesis Review  [X] Walker Management/Safety [X] Adaptive Equipment as Needed         Interdisciplinary Collaboration:   · Occupational Therapist and Registered Nurse     After treatment position/precautions:   · Up in chair, Bed/Chair-wheels locked, Bed in low position, Call light within reach and RN notified     Compliance with Program/Exercises: compliant all of the time. Recommendations/Intent for next treatment session:  Treatment next visit will focus on increasing Mr. Norriss independence with bed mobility, transfers, gait training, strength/ROM exercises, modalities for pain, and patient education.        Total Treatment Duration:25  OT Patient Time In/Time Out  Time In: 2779  Time Out: 30286 Lebron Ovalles, OT

## 2017-04-06 NOTE — PROGRESS NOTES
Awake,alert,,very conversant,,assessment completed,,Aqua katherine dressing on right knee intact,clean and dry with iceman cooling pad on site,,dorsiflexing both feet well,,took all p.o.medications without difficulty,,no distress noted. ...

## 2017-04-06 NOTE — PROGRESS NOTES
Pain level=4-5/10,,,given two tablets Oxycodone (5 mgs. each) with two tablets ES Tylenol tablets,prn for pain,,patient assisted with positioning in bed,,on continous O2 sat. monitoring. .. Radha Marvin

## 2017-04-06 NOTE — PROGRESS NOTES
Up to the bathroom ,voided and had a BM also,,assisted with positioning back in bed,,vital signs checked,,pain level=0/10 as stated,,on continous O2 sat. monitoring,,iceman to post-op. site on right knee. ..... Shelvy Setting

## 2017-04-06 NOTE — DISCHARGE INSTRUCTIONS
[facility]   Patient Discharge Instructions    Randall Lundborg / [id number] : 1940    Admitted 2017 Discharged: 2017     IF YOU HAVE ANY PROBLEMS ONCE YOU ARE AT HOME CALL THE FOLLOWING NUMBERS:   Main office number: [phone]    Take Home Medications     · It is important that you take the medication exactly as they are prescribed. · Keep your medication in the bottles provided by the pharmacist and keep a list of the medication names, dosages, and times to be taken in your wallet. · Do not take other medications without consulting your doctor. What to do at [address] your prehospital diet. If you have excessive nausea or vomitting call your doctor's office     Home Physical Therapy is arranged. Use rolling walker when walking. Use Srinivasa Hose stockings until we see you in the office for your follow up appointment with Dr. Alexandria Ureña. Patients who have had a joint replacement should not drive until you are seen for your follow up appointment by Dr. Alexandria Ureña. When to Call    - Call if you have a temperature greater then 101  - Unable to keep food down  - Loose control of your bladder or bowel function  - Are unable to bear any weight   - Need a pain medication refill       DISCHARGE SUMMARY from Nurse    The following personal items collected during your admission are returned to you:   Dental Appliance: Dental Appliances: None  Vision: Visual Aid: None  Hearing Aid:   na  Jewelry: Jewelry: None  Clothing: Clothing: At bedside  Other Valuables:  Other Valuables: None  Valuables sent to safe:   na    PATIENT INSTRUCTIONS:    After general anesthesia or intravenous sedation, for 24 hours or while taking prescription Narcotics:  · Limit your activities  · Do not drive and operate hazardous machinery  · Do not make important personal or business decisions  · Do  not drink alcoholic beverages  · If you have not urinated within 8 hours after discharge, please contact your surgeon on call. Report the following to your surgeon:  · Excessive pain, swelling, redness or odor of or around the surgical area  · Temperature over 101  · Nausea and vomiting lasting longer than 4 hours or if unable to take medications  · Any signs of decreased circulation or nerve impairment to extremity: change in color, persistent  numbness, tingling, coldness or increase pain  · Any questions, call office @ [phone]      Keep scheduled follow up appointment. If need to change, call office @ [phone]. *  Please give a list of your current medications to your Primary Care Provider. *  Please update this list whenever your medications are discontinued, doses are      changed, or new medications (including over-the-counter products) are added. *  Please carry medication information at all times in case of emergency situations. Total Knee Replacement: What to Expect at [address] [place] [address]    When you leave the hospital, you should be able to move around with a walker or crutches. But you will need someone to help you at home for the next few weeks or until you have more energy and can move around better. If there is no one to help you at home, you may go to a rehabilitation center. You will go home with a bandage and stitches or staples. Change the bandage as your doctor tells you to. Your doctor will remove your stitches or staples 10 to 21 days after your surgery. You may still have some mild pain, and the area may be swollen for 3 to 6 months after surgery. Your knee will continue to improve for 6 to 12 months. You will probably use a walker for 1 to 3 weeks and then use crutches. When you are ready, you can use a cane. You will probably be able to walk on your own in 4 to 8 weeks. You will need to do months of physical rehabilitation (rehab) after a knee replacement. Rehab will help you strengthen the muscles of the knee and help you regain movement.  After you recover, your artificial knee will allow you to do normal daily activities with less pain or no pain at all. You may be able to hike, dance, ride a bike, and play golf. Talk to your doctor about whether you can do more strenuous activities. Always tell your caregivers that you have an artificial knee. How long it will take to walk on your own, return to normal activities, and go back to work depends on your health and how well your rehabilitation (rehab) program goes. The better you do with your rehab exercises, the quicker you will get your strength and movement back. This care sheet gives you a general idea about how long it will take for you to recover. But each person recovers at a different pace. Follow the steps below to get better as quickly as possible. How can you care for yourself at home? Activity  · Rest when you feel tired. You may take a nap, but do not stay in bed all day. When you sit, use a chair with arms. You can use the arms to help you stand up. · Work with your physical therapist to find the best way to exercise. You may be able to take frequent, short walks using crutches or a walker. What you can do as your knee heals will depend on whether your new knee is cemented or uncemented. You may not be able to do certain things for a while if your new knee is uncemented. · After your knee has healed enough, you can do more strenuous activities with caution. ¨ You can golf, but use a golf cart, and do not wear shoes with spikes. ¨ You can bike on a flat road or on a stationary bike. Avoid biking up hills. ¨ Your doctor may suggest that you stay away from activities that put stress on your knee. These include tennis or badminton, squash or racquetball, contact sports like football, jumping (such as in basketball), jogging, or running. ¨ Avoid activities where you might fall. These include horseback riding, skiing, and mountain biking. · Do not sit for more than 1 hour at a time.  Get up and walk around for a while before you sit again. If you must sit for a long time, prop up your leg with a chair or footstool. This will help you avoid swelling. · Ask your doctor when you can shower. You may need to take sponge baths until your stitches or staples have been removed. · Ask your doctor when you can drive again. It may take up to 8 weeks after knee replacement surgery before it is safe for you to drive. · When you get into a car, sit on the edge of the seat. Then pull in your legs, and turn to face the front. · You should be able to do many everyday activities 3 to 6 weeks after your surgery. You will probably need to take 4 to 16 weeks off from work. When you can go back to work depends on the type of work you do and how you feel. · Ask your doctor when it is okay for you to have sex. · Do not lift anything heavier than 10 pounds and do not lift weights for 12 weeks. Diet  · By the time you leave the hospital, you should be eating your normal diet. If your stomach is upset, try bland, low-fat foods like plain rice, broiled chicken, toast, and yogurt. Your doctor may suggest that you take iron and vitamin supplements. · Drink plenty of fluids (unless your doctor tells you not to). · Eat healthy foods, and watch your portion sizes. Try to stay at your ideal weight. Too much weight puts more stress on your new knee. · You may notice that your bowel movements are not regular right after your surgery. This is common. Try to avoid constipation and straining with bowel movements. You may want to take a fiber supplement every day. If you have not had a bowel movement after a couple of days, ask your doctor about taking a mild laxative. Medicines  · Your doctor will tell you if and when you can restart your medicines. He or she will also give you instructions about taking any new medicines. · If you take blood thinners, such as warfarin (Coumadin), clopidogrel (Plavix), or aspirin, be sure to talk to your doctor.  He or she will tell you if and when to start taking those medicines again. Make sure that you understand exactly what your doctor wants you to do. · Your doctor may give you a blood-thinning medicine to prevent blood clots. If you take a blood thinner, be sure you get instructions about how to take your medicine safely. Blood thinners can cause serious bleeding problems. This medicine could be in pill form or as a shot (injection). If a shot is necessary, your doctor will tell you how to do this. · Be safe with medicines. Take pain medicines exactly as directed. ¨ If the doctor gave you a prescription medicine for pain, take it as prescribed. ¨ If you are not taking a prescription pain medicine, ask your doctor if you can take an over-the-counter medicine. ¨ Plan to take your pain medicine 30 minutes before exercises. It is easier to prevent pain before it starts than to stop it once it has started. · If you think your pain medicine is making you sick to your stomach:  ¨ Take your medicine after meals (unless your doctor has told you not to). ¨ Ask your doctor for a different pain medicine. · If your doctor prescribed antibiotics, take them as directed. Do not stop taking them just because you feel better. You need to take the full course of antibiotics. Incision care  · You will have a bandage over the cut (incision) and staples or stitches. Take the bandage off when your doctor says it is okay. · Your doctor will remove the staples or stitches 10 days to 3 weeks after the surgery and replace them with strips of tape. Leave the tape on for a week or until it falls off. Exercise  · Your rehab program will give you a number of exercises to do to help you get back your knee's range of motion and strength. Always do them as your therapist tells you. Ice and elevation  · For pain and swelling, put ice or a cold pack on the area for 10 to 20 minutes at a time. Put a thin cloth between the ice and your skin.   Other instructions  · Continue to wear your support stockings as your doctor says. These help to prevent blood clots. The length of time that you will have to wear them depends on your activity level and the amount of swelling. · Wear medical alert jewelry that says you may need antibiotics before any procedure, including dental work. You can buy this at most drugstores. · You have metal pieces in your knee. These may set off some airport metal detectors. Carry a medical alert card that says you have an artificial joint, just in case. Follow-up care is a key part of your treatment and safety. Be sure to make and go to all appointments, and call your doctor if you are having problems. It's also a good idea to know your test results and keep a list of the medicines you take. When should you call for help? Call 911 anytime you think you may need emergency care. For example, call if:  · You passed out (lost consciousness). · You have severe trouble breathing. · You have sudden chest pain and shortness of breath, or you cough up blood. Call your doctor now or seek immediate medical care if:  · You have signs of infection, such as:  ¨ Increased pain, swelling, warmth, or redness. ¨ Red streaks leading from the incision. ¨ Pus draining from the incision. ¨ A fever. · You have signs of a blood clot, such as:  ¨ Pain in your calf, back of the knee, thigh, or groin. ¨ Redness and swelling in your leg or groin. · Your incision comes open and begins to bleed, or the bleeding increases. · You have pain that does not get better after you take pain medicine. Watch closely for changes in your health, and be sure to contact your doctor if:  · You do not have a bowel movement after taking a laxative. Where can you learn more? Go to http://annie-kelvin.info/. Enter C827 in the search box to learn more about \"Total Knee Replacement: What to Expect at Home. \"  Current as of: August 4, 2016  Content Version: 11.2  © 3046-6383 [employer]. Care instructions adapted under license by [employer] (which disclaims liability or warranty for this information). If you have questions about a medical condition or this instruction, always ask your healthcare professional. [address] any warranty or liability for your use of this information. These are general instructions for a healthy lifestyle:    No smoking/ No tobacco products/ Avoid exposure to second hand smoke    Surgeon General's Warning:  Quitting smoking now greatly reduces serious risk to your health. Obesity, smoking, and sedentary lifestyle greatly increases your risk for illness    A healthy diet, regular physical exercise & weight monitoring are important for maintaining a healthy lifestyle    You may be retaining fluid if you have a history of heart failure or if you experience any of the following symptoms:  Weight gain of 3 pounds or more overnight or 5 pounds in a week, increased swelling in our hands or feet or shortness of breath while lying flat in bed. Please call your doctor as soon as you notice any of these symptoms; do not wait until your next office visit. Recognize signs and symptoms of STROKE:    F-face looks uneven    A-arms unable to move or move even    S-speech slurred or non-existent    T-time-call 911 as soon as signs and symptoms begin-DO NOT go       Back to bed or wait to see if you get better-TIME IS BRAIN. The discharge information has been reviewed with the patient. The patient verbalized understanding. Information obtained by :  I understand that if any problems occur once I am at home I am to contact my physician. I understand and acknowledge receipt of the instructions indicated above.                                                                                                                                            Physician's or R.N.'s Signature Date/Time                                                                                                                                              Patient or Representative Signature                                                          Date/Time

## (undated) DEVICE — DRAPE SHT 3 QTR PROXIMA 53X77 --

## (undated) DEVICE — SOLUTION IV 1000ML 0.9% SOD CHL

## (undated) DEVICE — SUTURE VCRL SZ 2-0 L27IN ABSRB UD L36MM CP-1 1/2 CIR REV J266H

## (undated) DEVICE — CURETTE BNE CEM 10IN DISP --

## (undated) DEVICE — 2000CC GUARDIAN II: Brand: GUARDIAN

## (undated) DEVICE — SYR LR LCK 1ML GRAD NSAF 30ML --

## (undated) DEVICE — PACK PROCEDURE SURG TOT KNEE

## (undated) DEVICE — Z DISCONTINUED USE 2744636  DRESSING AQUACEL 14 IN ALG W3.5XL14IN POLYUR FLM CVR W/ HYDRCOLL

## (undated) DEVICE — MEDI-VAC NON-CONDUCTIVE SUCTION TUBING: Brand: CARDINAL HEALTH

## (undated) DEVICE — SOLUTION IRRIG 3000ML 0.9% SOD CHL FLX CONT 0797208] ICU MEDICAL INC]

## (undated) DEVICE — GOWN,REINF,POLY,ECL,PP SLV,XL: Brand: MEDLINE

## (undated) DEVICE — BLADE SAW W12.5XL73.5MM THK0.8MM CUT THK1MM RECIP FOR L BNE

## (undated) DEVICE — GAUZE,SPONGE,8"X4",12PLY,XRAY,STRL,LF: Brand: MEDLINE

## (undated) DEVICE — MEDI-VAC YANKAUER SUCTION HANDLE W/BULBOUS TIP: Brand: CARDINAL HEALTH

## (undated) DEVICE — FAN SPRAY KIT: Brand: PULSAVAC®

## (undated) DEVICE — TRAY PREP DRY W/ PREM GLV 2 APPL 6 SPNG 2 UNDPD 1 OVERWRAP

## (undated) DEVICE — T4 HOOD

## (undated) DEVICE — DRAPE,TOP,102X53,STERILE: Brand: MEDLINE

## (undated) DEVICE — SKIN STAPLER: Brand: SIGNET

## (undated) DEVICE — SYR 50ML LR LCK 1ML GRAD NSAF --

## (undated) DEVICE — (D)PREP SKN CHLRAPRP APPL 26ML -- CONVERT TO ITEM 371833

## (undated) DEVICE — BLADE SAW PAT RMR PILT H 51MM --

## (undated) DEVICE — Device: Brand: POWER-FLO®

## (undated) DEVICE — PACK TKR SZ 8 FEM PREP TRL POST STBL INTUITION SOLO ATTUNE

## (undated) DEVICE — SUTURE VCRL SZ 1 L27IN ABSRB UD L36MM CP-1 1/2 CIR REV CUT J268H

## (undated) DEVICE — BIPOLAR SEALER 23-112-1 AQM 6.0: Brand: AQUAMANTYS ®

## (undated) DEVICE — 3M™ COBAN™ NL STERILE NON-LATEX SELF-ADHERENT WRAP, 2084S, 4 IN X 5 YD (10 CM X 4,5 M), 18 ROLLS/CASE: Brand: 3M™ COBAN™

## (undated) DEVICE — NEEDLE HYPO 18GA L1.5IN PNK S STL HUB POLYPR SHLD REG BVL

## (undated) DEVICE — SOLUTION IV 500ML 0.9% SOD CHL FLX CONT

## (undated) DEVICE — 3000CC GUARDIAN II: Brand: GUARDIAN

## (undated) DEVICE — DRAPE,U/SHT,SPLIT,FILM,60X84,STERILE: Brand: MEDLINE

## (undated) DEVICE — REM POLYHESIVE ADULT PATIENT RETURN ELECTRODE: Brand: VALLEYLAB

## (undated) DEVICE — 3M™ IOBAN™ 2 ANTIMICROBIAL INCISE DRAPE 6650EZ: Brand: IOBAN™ 2

## (undated) DEVICE — TRAY CATH 16F DRN BG LTX -- CONVERT TO ITEM 363158

## (undated) DEVICE — SUTURE STRATAFIX SPRL SZ 1 L5IN ABSRB VLT CT-1 L36MM 1/2 SXPD2B401

## (undated) DEVICE — MCLASS OSCILLATING SAW BLADE 19 X 1.27 (0.050") X 90 MM: Brand: MCLASS

## (undated) DEVICE — BUTTON SWITCH PENCIL BLADE ELECTRODE, HOLSTER: Brand: EDGE